# Patient Record
Sex: FEMALE | Race: WHITE | NOT HISPANIC OR LATINO | Employment: OTHER | RURAL
[De-identification: names, ages, dates, MRNs, and addresses within clinical notes are randomized per-mention and may not be internally consistent; named-entity substitution may affect disease eponyms.]

---

## 2017-03-23 ENCOUNTER — OFFICE VISIT (OUTPATIENT)
Dept: CARDIOLOGY | Facility: CLINIC | Age: 57
End: 2017-03-23

## 2017-03-23 VITALS
DIASTOLIC BLOOD PRESSURE: 82 MMHG | WEIGHT: 211 LBS | BODY MASS INDEX: 33.91 KG/M2 | SYSTOLIC BLOOD PRESSURE: 162 MMHG | HEIGHT: 66 IN | HEART RATE: 78 BPM

## 2017-03-23 DIAGNOSIS — I73.9 PAD (PERIPHERAL ARTERY DISEASE) (HCC): ICD-10-CM

## 2017-03-23 DIAGNOSIS — E11.59 TYPE 2 DIABETES MELLITUS WITH OTHER CIRCULATORY COMPLICATION: ICD-10-CM

## 2017-03-23 DIAGNOSIS — I25.810 CORONARY ARTERY DISEASE INVOLVING CORONARY BYPASS GRAFT OF NATIVE HEART WITHOUT ANGINA PECTORIS: Primary | ICD-10-CM

## 2017-03-23 PROCEDURE — 93000 ELECTROCARDIOGRAM COMPLETE: CPT | Performed by: INTERNAL MEDICINE

## 2017-03-23 PROCEDURE — 99213 OFFICE O/P EST LOW 20 MIN: CPT | Performed by: INTERNAL MEDICINE

## 2017-03-23 RX ORDER — ROSUVASTATIN CALCIUM 20 MG/1
20 TABLET, COATED ORAL DAILY
COMMUNITY
End: 2017-10-05 | Stop reason: ALTCHOICE

## 2017-03-23 RX ORDER — LISINOPRIL 10 MG/1
10 TABLET ORAL DAILY
COMMUNITY
End: 2017-10-05 | Stop reason: ALTCHOICE

## 2017-03-23 NOTE — PROGRESS NOTES
Subjective:     Encounter Date:03/23/2017      Patient ID: Vandana Glass is a 56 y.o. female.    Chief Complaint: PAD, CAD, DM    History of Present Illness     Dear Dr. Chase,      I had the pleasure of seeing  the patient in cardiac followup today.  As you well know, she is a dana 56-year-old woman with a history of coronary artery disease, status post bypass surgery.   She has peripheral arterial disease, status post aortobifemoral bypass.   She continues to smoke.  She has diabetes with an AIC of 8.5.        Over the past six months she has had no worsening of her symptoms.  Unfortunately, she has not make any progress with reducing her cardiac risk factors.           Review of Systems   All other systems reviewed and are negative.        ECG 12 Lead  Date/Time: 3/23/2017 11:29 AM  Performed by: MARGARITA SPRAGUE  Authorized by: MARGARITA SPRAGUE   Rhythm: sinus rhythm  BPM: 78  Comments: NSSTTWA               Objective:     Physical Exam   Constitutional: She is oriented to person, place, and time. She appears well-developed and well-nourished.   HENT:   Head: Normocephalic and atraumatic.   Neck: Normal range of motion. Neck supple.   Cardiovascular: Normal rate, regular rhythm and normal heart sounds.    Pulmonary/Chest: Effort normal and breath sounds normal.   Abdominal: Soft. Bowel sounds are normal.   Musculoskeletal: Normal range of motion.   Neurological: She is alert and oriented to person, place, and time.   Skin: Skin is warm and dry.   Psychiatric: She has a normal mood and affect. Her behavior is normal. Thought content normal.   Vitals reviewed.      Lab Review:       Assessment:          Diagnosis Plan   1. Coronary artery disease involving coronary bypass graft of native heart without angina pectoris     2. Type 2 diabetes mellitus with other circulatory complication     3. PAD (peripheral artery disease)            Plan:        It was a pleasure to see the patient in cardiac followup today.  She is  doing well from a cardiac standpoint.  She has no angina or heart failure symptoms.  We talked at length about managing her cardiac risk factors. I have urged her to quit smoking and have also encouraged her to try to walk at least 15 minutes per day as a start.   Hopefully, this will motivate her to change.  Otherwise, I think there are high risks in the future.       Coronary Artery Disease  Assessment  • The patient has no angina    Plan  • Lifestyle modifications discussed include adhering to a heart healthy diet, avoidance of tobacco products, maintenance of a healthy weight, medication compliance, regular exercise and regular monitoring of cholesterol and blood pressure    Subjective - Objective  • There is a history of previous coronary artery bypass graft  • There has been a previous stent procedure using JORGE  • Current antiplatelet therapy includes aspirin 81 mg

## 2017-10-05 ENCOUNTER — OFFICE VISIT (OUTPATIENT)
Dept: CARDIOLOGY | Facility: CLINIC | Age: 57
End: 2017-10-05

## 2017-10-05 VITALS
SYSTOLIC BLOOD PRESSURE: 132 MMHG | HEART RATE: 68 BPM | BODY MASS INDEX: 34.72 KG/M2 | HEIGHT: 66 IN | WEIGHT: 216 LBS | DIASTOLIC BLOOD PRESSURE: 80 MMHG

## 2017-10-05 DIAGNOSIS — F17.200 SMOKING: ICD-10-CM

## 2017-10-05 DIAGNOSIS — I73.9 PAD (PERIPHERAL ARTERY DISEASE) (HCC): ICD-10-CM

## 2017-10-05 DIAGNOSIS — I10 ESSENTIAL HYPERTENSION: ICD-10-CM

## 2017-10-05 DIAGNOSIS — I25.810 CORONARY ARTERY DISEASE INVOLVING CORONARY BYPASS GRAFT OF NATIVE HEART WITHOUT ANGINA PECTORIS: Primary | ICD-10-CM

## 2017-10-05 PROCEDURE — 99213 OFFICE O/P EST LOW 20 MIN: CPT | Performed by: INTERNAL MEDICINE

## 2017-10-05 PROCEDURE — 93000 ELECTROCARDIOGRAM COMPLETE: CPT | Performed by: INTERNAL MEDICINE

## 2017-10-05 RX ORDER — LISINOPRIL 20 MG/1
TABLET ORAL
COMMUNITY
Start: 2017-08-17 | End: 2019-03-07 | Stop reason: ALTCHOICE

## 2017-10-05 RX ORDER — ROSUVASTATIN CALCIUM 40 MG/1
TABLET, COATED ORAL
COMMUNITY
Start: 2017-08-17

## 2017-10-05 NOTE — PROGRESS NOTES
Subjective:     Encounter Date:10/05/2017      Patient ID: Vandana Glass is a 57 y.o. female.    Chief Complaint: CAD, PAD, smoking    History of Present Illness    Dear Dr. Chase,     I had the pleasure of seeing your patient in cardiac followup today.  As you well know, she is a dana 57-year-old woman with history of coronary artery disease status post bypass surgery.  She has peripheral arterial disease status post aortobifemoral bypass.  She has diabetes and she continues to smoke.      She comes in for her six month followup.  Since I have last seen her, she says she has reduced her smoking but has not quit.  She has trouble walking due to difficulty tolerating the humidity.  She says that as the weather gets cooler, she will increase her activity.          Review of Systems   All other systems reviewed and are negative.        ECG 12 Lead  Date/Time: 10/5/2017 11:03 AM  Performed by: MARGARITA SPRAGUE  Authorized by: MARGARITA SPRAGUE   Comparison: compared with previous ECG   Similar to previous ECG  Rhythm: sinus rhythm  BPM: 68  T depression: II, III, aVF, V6 and V5                 Objective:     Physical Exam   Constitutional: She is oriented to person, place, and time. She appears well-developed and well-nourished.   HENT:   Head: Normocephalic and atraumatic.   Neck: Normal range of motion. Neck supple.   Cardiovascular: Normal rate, regular rhythm and normal heart sounds.    Pulmonary/Chest: Effort normal and breath sounds normal.   Abdominal: Soft. Bowel sounds are normal.   Musculoskeletal: Normal range of motion.   Neurological: She is alert and oriented to person, place, and time.   Skin: Skin is warm and dry.   Psychiatric: She has a normal mood and affect. Her behavior is normal. Thought content normal.   Vitals reviewed.      Lab Review:       Assessment:          Diagnosis Plan   1. Coronary artery disease involving coronary bypass graft of native heart without angina pectoris     2. PAD (peripheral  artery disease)     3. Essential hypertension     4. Smoking            Plan:       It was a pleasure to see your patient in cardiac followup today.  She is stable without any complaints of angina or heart failure.  She does complain of some musculoskeletal type, band-like chest pain.  She says that if it happens at rest she moves around and if it happens while she is moving around, she will rest.  I do not think this is an anginal complaint.  She will see me again in six months or sooner if symptoms warrant.      Coronary Artery Disease  Assessment  • The patient has no angina    Plan  • Lifestyle modifications discussed include adhering to a heart healthy diet, avoidance of tobacco products, maintenance of a healthy weight, medication compliance, regular exercise and regular monitoring of cholesterol and blood pressure    Subjective - Objective  • There is a history of previous coronary artery bypass graft  • Current antiplatelet therapy includes aspirin 81 mg and clopidogrel 75 mg

## 2018-09-06 ENCOUNTER — OFFICE VISIT (OUTPATIENT)
Dept: CARDIOLOGY | Facility: CLINIC | Age: 58
End: 2018-09-06

## 2018-09-06 VITALS
HEART RATE: 67 BPM | WEIGHT: 229 LBS | HEIGHT: 66 IN | DIASTOLIC BLOOD PRESSURE: 78 MMHG | BODY MASS INDEX: 36.8 KG/M2 | SYSTOLIC BLOOD PRESSURE: 162 MMHG

## 2018-09-06 DIAGNOSIS — I73.9 PAD (PERIPHERAL ARTERY DISEASE) (HCC): ICD-10-CM

## 2018-09-06 DIAGNOSIS — I25.810 CORONARY ARTERY DISEASE INVOLVING CORONARY BYPASS GRAFT OF NATIVE HEART WITHOUT ANGINA PECTORIS: Primary | ICD-10-CM

## 2018-09-06 DIAGNOSIS — F17.200 SMOKING: ICD-10-CM

## 2018-09-06 DIAGNOSIS — I10 ESSENTIAL HYPERTENSION: ICD-10-CM

## 2018-09-06 PROCEDURE — 99213 OFFICE O/P EST LOW 20 MIN: CPT | Performed by: INTERNAL MEDICINE

## 2018-09-06 PROCEDURE — 93000 ELECTROCARDIOGRAM COMPLETE: CPT | Performed by: INTERNAL MEDICINE

## 2018-09-06 NOTE — PROGRESS NOTES
Subjective:     Encounter Date:09/06/2018      Patient ID: Vandana Glass is a 58 y.o. female.    Chief Complaint: CAD, PAD, smoking    History of Present Illness    Dear Dr. Chase:    I had the pleasure of seeing your patient in cardiac followup today. As you well know, she is a dana 57-year-old woman with history of coronary artery disease, status post multiple stents and a ventral bypass surgery. She has been angina-free since.    She has peripheral arterial disease, status post iliac stenting as well as an aortofemoral bypass on the right. Unfortunately, she has diabetes and she continues to smoke.    Since I have last seen her, she reports doing pretty well. She says that she is starting to feel a little pinching in her left groin. She has some mild claudication but is not yet lifestyle limiting. She does not have any evidence of foot ulcer or infection.        Review of Systems   All other systems reviewed and are negative.        ECG 12 Lead  Date/Time: 9/6/2018 12:52 PM  Performed by: MARGARITA SPRAGUE  Authorized by: MARGARITA SPRAGUE   Comparison: compared with previous ECG   Similar to previous ECG  Rhythm: sinus rhythm  BPM: 67  Comments: Diffuse T wave inversion               Objective:     Physical Exam   Constitutional: She is oriented to person, place, and time. She appears well-developed and well-nourished.   HENT:   Head: Normocephalic and atraumatic.   Neck: Normal range of motion. Neck supple.   Cardiovascular: Normal rate, regular rhythm and normal heart sounds.    Pulmonary/Chest: Effort normal and breath sounds normal.   Abdominal: Soft. Bowel sounds are normal.   Musculoskeletal: Normal range of motion.   Neurological: She is alert and oriented to person, place, and time.   Skin: Skin is warm and dry.   Psychiatric: She has a normal mood and affect. Her behavior is normal. Thought content normal.   Vitals reviewed.      Lab Review:       Assessment:          Diagnosis Plan   1. Coronary artery disease  involving coronary bypass graft of native heart without angina pectoris     2. PAD (peripheral artery disease) (CMS/[facility])     3. Smoking     4. Essential hypertension            Plan:       It was a pleasure to see Vandana Glass in cardiac followup today.  She is doing well from the cardiac standpoint without any complaints of angina.  Unfortunately, she continues to smoke and is not interested in quitting.  She is on a good medical regimen for coronary prevention.    She has peripheral arterial disease with some increasing left leg claudication.  At this time it is not yet lifestyle limiting.  She has no evidence of critical limb ischemia.  I recommended she see me again in six months for reassessment.      Coronary Artery Disease  Assessment  • The patient has no angina    Plan  • Lifestyle modifications discussed include adhering to a heart healthy diet, avoidance of tobacco products, maintenance of a healthy weight, medication compliance, regular exercise and regular monitoring of cholesterol and blood pressure    Subjective - Objective  • There is a history of previous coronary artery bypass graft  • Current antiplatelet therapy includes aspirin 81 mg and clopidogrel 75 mg

## 2018-12-28 ENCOUNTER — HOSPITAL ENCOUNTER (INPATIENT)
Facility: HOSPITAL | Age: 58
LOS: 1 days | Discharge: HOME | End: 2018-12-28
Attending: INTERNAL MEDICINE | Admitting: INTERNAL MEDICINE

## 2018-12-28 VITALS
WEIGHT: 203 LBS | RESPIRATION RATE: 16 BRPM | BODY MASS INDEX: 32.62 KG/M2 | DIASTOLIC BLOOD PRESSURE: 60 MMHG | OXYGEN SATURATION: 95 % | HEIGHT: 66 IN | SYSTOLIC BLOOD PRESSURE: 148 MMHG | HEART RATE: 71 BPM | TEMPERATURE: 98 F

## 2018-12-28 DIAGNOSIS — I21.4 NSTEMI (NON-ST ELEVATED MYOCARDIAL INFARCTION) (HCC): Primary | ICD-10-CM

## 2018-12-28 LAB — GLUCOSE BLDC GLUCOMTR-MCNC: 75 MG/DL (ref 70–130)

## 2018-12-28 PROCEDURE — B2131ZZ FLUOROSCOPY OF MULTIPLE CORONARY ARTERY BYPASS GRAFTS USING LOW OSMOLAR CONTRAST: ICD-10-PCS | Performed by: INTERNAL MEDICINE

## 2018-12-28 PROCEDURE — C1769 GUIDE WIRE: HCPCS | Performed by: INTERNAL MEDICINE

## 2018-12-28 PROCEDURE — 25010000002 HEPARIN (PORCINE) PER 1000 UNITS: Performed by: INTERNAL MEDICINE

## 2018-12-28 PROCEDURE — B2151ZZ FLUOROSCOPY OF LEFT HEART USING LOW OSMOLAR CONTRAST: ICD-10-PCS | Performed by: INTERNAL MEDICINE

## 2018-12-28 PROCEDURE — 25010000002 MIDAZOLAM PER 1 MG: Performed by: INTERNAL MEDICINE

## 2018-12-28 PROCEDURE — 99152 MOD SED SAME PHYS/QHP 5/>YRS: CPT | Performed by: INTERNAL MEDICINE

## 2018-12-28 PROCEDURE — 4A023N7 MEASUREMENT OF CARDIAC SAMPLING AND PRESSURE, LEFT HEART, PERCUTANEOUS APPROACH: ICD-10-PCS | Performed by: INTERNAL MEDICINE

## 2018-12-28 PROCEDURE — 93459 L HRT ART/GRFT ANGIO: CPT | Performed by: INTERNAL MEDICINE

## 2018-12-28 PROCEDURE — 82962 GLUCOSE BLOOD TEST: CPT

## 2018-12-28 PROCEDURE — C1894 INTRO/SHEATH, NON-LASER: HCPCS | Performed by: INTERNAL MEDICINE

## 2018-12-28 PROCEDURE — 0 IOPAMIDOL PER 1 ML: Performed by: INTERNAL MEDICINE

## 2018-12-28 PROCEDURE — 25010000002 FENTANYL CITRATE (PF) 100 MCG/2ML SOLUTION: Performed by: INTERNAL MEDICINE

## 2018-12-28 PROCEDURE — 99223 1ST HOSP IP/OBS HIGH 75: CPT | Performed by: INTERNAL MEDICINE

## 2018-12-28 PROCEDURE — B2111ZZ FLUOROSCOPY OF MULTIPLE CORONARY ARTERIES USING LOW OSMOLAR CONTRAST: ICD-10-PCS | Performed by: INTERNAL MEDICINE

## 2018-12-28 RX ORDER — MIDAZOLAM HYDROCHLORIDE 1 MG/ML
INJECTION INTRAMUSCULAR; INTRAVENOUS AS NEEDED
Status: DISCONTINUED | OUTPATIENT
Start: 2018-12-28 | End: 2018-12-28 | Stop reason: HOSPADM

## 2018-12-28 RX ORDER — FENTANYL CITRATE 50 UG/ML
INJECTION, SOLUTION INTRAMUSCULAR; INTRAVENOUS AS NEEDED
Status: DISCONTINUED | OUTPATIENT
Start: 2018-12-28 | End: 2018-12-28 | Stop reason: HOSPADM

## 2018-12-28 RX ORDER — ACETAMINOPHEN 325 MG/1
650 TABLET ORAL EVERY 4 HOURS PRN
Status: DISCONTINUED | OUTPATIENT
Start: 2018-12-28 | End: 2018-12-28 | Stop reason: HOSPADM

## 2018-12-28 RX ORDER — ROSUVASTATIN CALCIUM 40 MG/1
40 TABLET, COATED ORAL DAILY
Status: DISCONTINUED | OUTPATIENT
Start: 2018-12-28 | End: 2018-12-28 | Stop reason: HOSPADM

## 2018-12-28 RX ORDER — SODIUM CHLORIDE 9 MG/ML
100 INJECTION, SOLUTION INTRAVENOUS CONTINUOUS
Status: ACTIVE | OUTPATIENT
Start: 2018-12-28 | End: 2018-12-28

## 2018-12-28 RX ORDER — ASPIRIN 81 MG/1
81 TABLET ORAL DAILY
Status: DISCONTINUED | OUTPATIENT
Start: 2018-12-28 | End: 2018-12-28 | Stop reason: HOSPADM

## 2018-12-28 RX ORDER — NICOTINE POLACRILEX 4 MG
15 LOZENGE BUCCAL
Status: DISCONTINUED | OUTPATIENT
Start: 2018-12-28 | End: 2018-12-28 | Stop reason: HOSPADM

## 2018-12-28 RX ORDER — FUROSEMIDE 40 MG/1
40 TABLET ORAL DAILY
Status: DISCONTINUED | OUTPATIENT
Start: 2018-12-28 | End: 2018-12-28 | Stop reason: HOSPADM

## 2018-12-28 RX ORDER — DEXTROSE MONOHYDRATE 25 G/50ML
25 INJECTION, SOLUTION INTRAVENOUS
Status: DISCONTINUED | OUTPATIENT
Start: 2018-12-28 | End: 2018-12-28 | Stop reason: HOSPADM

## 2018-12-28 RX ORDER — CITALOPRAM 40 MG/1
40 TABLET ORAL DAILY
Status: DISCONTINUED | OUTPATIENT
Start: 2018-12-28 | End: 2018-12-28 | Stop reason: HOSPADM

## 2018-12-28 RX ORDER — SODIUM CHLORIDE 0.9 % (FLUSH) 0.9 %
3-10 SYRINGE (ML) INJECTION AS NEEDED
Status: DISCONTINUED | OUTPATIENT
Start: 2018-12-28 | End: 2018-12-28 | Stop reason: HOSPADM

## 2018-12-28 RX ORDER — INSULIN GLARGINE 100 [IU]/ML
50 INJECTION, SOLUTION SUBCUTANEOUS EVERY 12 HOURS SCHEDULED
Status: DISCONTINUED | OUTPATIENT
Start: 2018-12-28 | End: 2018-12-28 | Stop reason: HOSPADM

## 2018-12-28 RX ORDER — LIDOCAINE HYDROCHLORIDE 20 MG/ML
INJECTION, SOLUTION INFILTRATION; PERINEURAL AS NEEDED
Status: DISCONTINUED | OUTPATIENT
Start: 2018-12-28 | End: 2018-12-28 | Stop reason: HOSPADM

## 2018-12-28 RX ORDER — LOSARTAN POTASSIUM 50 MG/1
50 TABLET ORAL DAILY
Status: DISCONTINUED | OUTPATIENT
Start: 2018-12-28 | End: 2018-12-28 | Stop reason: HOSPADM

## 2018-12-28 RX ORDER — NITROGLYCERIN 0.4 MG/1
0.4 TABLET SUBLINGUAL
Status: DISCONTINUED | OUTPATIENT
Start: 2018-12-28 | End: 2018-12-28 | Stop reason: HOSPADM

## 2018-12-28 RX ORDER — PANTOPRAZOLE SODIUM 40 MG/1
40 TABLET, DELAYED RELEASE ORAL DAILY
Status: DISCONTINUED | OUTPATIENT
Start: 2018-12-28 | End: 2018-12-28 | Stop reason: HOSPADM

## 2018-12-28 RX ORDER — SODIUM CHLORIDE 0.9 % (FLUSH) 0.9 %
3 SYRINGE (ML) INJECTION EVERY 12 HOURS SCHEDULED
Status: DISCONTINUED | OUTPATIENT
Start: 2018-12-28 | End: 2018-12-28 | Stop reason: HOSPADM

## 2018-12-28 RX ORDER — LISINOPRIL 20 MG/1
20 TABLET ORAL
Status: DISCONTINUED | OUTPATIENT
Start: 2018-12-28 | End: 2018-12-28 | Stop reason: HOSPADM

## 2018-12-28 RX ORDER — SODIUM CHLORIDE 9 MG/ML
INJECTION, SOLUTION INTRAVENOUS CONTINUOUS PRN
Status: COMPLETED | OUTPATIENT
Start: 2018-12-28 | End: 2018-12-28

## 2018-12-28 RX ORDER — CLOPIDOGREL BISULFATE 75 MG/1
75 TABLET ORAL DAILY
Status: DISCONTINUED | OUTPATIENT
Start: 2018-12-28 | End: 2018-12-28 | Stop reason: HOSPADM

## 2018-12-29 ENCOUNTER — READMISSION MANAGEMENT (OUTPATIENT)
Dept: CALL CENTER | Facility: HOSPITAL | Age: 58
End: 2018-12-29

## 2018-12-29 NOTE — OUTREACH NOTE
Prep Survey      Responses   Facility patient discharged from?  [facility]   Is patient eligible?  No   What are the reasons patient is not eligible?  Other   Does the patient have one of the following disease processes/diagnoses(primary or secondary)?  Other   Prep survey completed?  Yes          Alondra Gutiérrez RN

## 2018-12-31 ENCOUNTER — TELEPHONE (OUTPATIENT)
Dept: CARDIAC REHAB | Facility: HOSPITAL | Age: 58
End: 2018-12-31

## 2018-12-31 NOTE — TELEPHONE ENCOUNTER
Called pt secondary to referral from Dr. Parra concerning cardiac rehab.  Pt lives in [place], [place] and does not have a car.  She thinks she could manage to get to program at [facility] in [place] though.  I provided pt with the telephone number to that program.  She will call and set up appt.

## 2019-01-24 ENCOUNTER — TELEPHONE (OUTPATIENT)
Dept: CARDIOLOGY | Facility: CLINIC | Age: 59
End: 2019-01-24

## 2019-03-07 ENCOUNTER — OFFICE VISIT (OUTPATIENT)
Dept: CARDIOLOGY | Facility: CLINIC | Age: 59
End: 2019-03-07

## 2019-03-07 VITALS
WEIGHT: 200 LBS | SYSTOLIC BLOOD PRESSURE: 160 MMHG | DIASTOLIC BLOOD PRESSURE: 82 MMHG | HEART RATE: 68 BPM | BODY MASS INDEX: 32.14 KG/M2 | HEIGHT: 66 IN

## 2019-03-07 DIAGNOSIS — I25.810 CORONARY ARTERY DISEASE INVOLVING CORONARY BYPASS GRAFT OF NATIVE HEART WITHOUT ANGINA PECTORIS: Primary | ICD-10-CM

## 2019-03-07 DIAGNOSIS — I73.9 PAD (PERIPHERAL ARTERY DISEASE) (HCC): ICD-10-CM

## 2019-03-07 DIAGNOSIS — I10 ESSENTIAL HYPERTENSION: ICD-10-CM

## 2019-03-07 DIAGNOSIS — F17.200 SMOKING: ICD-10-CM

## 2019-03-07 PROCEDURE — 93000 ELECTROCARDIOGRAM COMPLETE: CPT | Performed by: INTERNAL MEDICINE

## 2019-03-07 PROCEDURE — 99213 OFFICE O/P EST LOW 20 MIN: CPT | Performed by: INTERNAL MEDICINE
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
## 2019-11-10 ENCOUNTER — HOSPITAL ENCOUNTER (OUTPATIENT)
Facility: HOSPITAL | Age: 59
Setting detail: OBSERVATION
Discharge: HOME | End: 2019-11-13
Attending: INTERNAL MEDICINE | Admitting: INTERNAL MEDICINE

## 2019-11-10 DIAGNOSIS — I25.119 CORONARY ARTERY DISEASE INVOLVING NATIVE CORONARY ARTERY OF NATIVE HEART WITH ANGINA PECTORIS (HCC): Primary | ICD-10-CM

## 2019-11-10 PROBLEM — R07.9 CHEST PAIN: Status: ACTIVE | Noted: 2019-11-10

## 2019-11-10 LAB
GLUCOSE BLDC GLUCOMTR-MCNC: 100 MG/DL (ref 70–130)
GLUCOSE BLDC GLUCOMTR-MCNC: 129 MG/DL (ref 70–130)
TROPONIN T SERPL-MCNC: <0.01 NG/ML (ref 0–0.03)

## 2019-11-10 PROCEDURE — 93005 ELECTROCARDIOGRAM TRACING: CPT | Performed by: INTERNAL MEDICINE

## 2019-11-10 PROCEDURE — 82962 GLUCOSE BLOOD TEST: CPT

## 2019-11-10 PROCEDURE — 84484 ASSAY OF TROPONIN QUANT: CPT | Performed by: INTERNAL MEDICINE

## 2019-11-10 PROCEDURE — 93010 ELECTROCARDIOGRAM REPORT: CPT | Performed by: INTERNAL MEDICINE

## 2019-11-10 PROCEDURE — 99220 PR INITIAL OBSERVATION CARE/DAY 70 MINUTES: CPT | Performed by: INTERNAL MEDICINE

## 2019-11-10 PROCEDURE — G0378 HOSPITAL OBSERVATION PER HR: HCPCS

## 2019-11-10 RX ORDER — CLOPIDOGREL BISULFATE 75 MG/1
75 TABLET ORAL DAILY
Status: DISCONTINUED | OUTPATIENT
Start: 2019-11-10 | End: 2019-11-13 | Stop reason: HOSPADM

## 2019-11-10 RX ORDER — PANTOPRAZOLE SODIUM 40 MG/1
40 TABLET, DELAYED RELEASE ORAL DAILY
Status: DISCONTINUED | OUTPATIENT
Start: 2019-11-10 | End: 2019-11-13 | Stop reason: HOSPADM

## 2019-11-10 RX ORDER — SODIUM CHLORIDE 0.9 % (FLUSH) 0.9 %
10 SYRINGE (ML) INJECTION EVERY 12 HOURS SCHEDULED
Status: DISCONTINUED | OUTPATIENT
Start: 2019-11-10 | End: 2019-11-13 | Stop reason: HOSPADM

## 2019-11-10 RX ORDER — ASPIRIN 81 MG/1
81 TABLET ORAL DAILY
Status: DISCONTINUED | OUTPATIENT
Start: 2019-11-10 | End: 2019-11-13 | Stop reason: HOSPADM

## 2019-11-10 RX ORDER — ROSUVASTATIN CALCIUM 20 MG/1
20 TABLET, COATED ORAL NIGHTLY
Status: DISCONTINUED | OUTPATIENT
Start: 2019-11-10 | End: 2019-11-13 | Stop reason: HOSPADM

## 2019-11-10 RX ORDER — NITROGLYCERIN 0.4 MG/1
0.4 TABLET SUBLINGUAL
Status: DISCONTINUED | OUTPATIENT
Start: 2019-11-10 | End: 2019-11-13 | Stop reason: HOSPADM

## 2019-11-10 RX ORDER — CITALOPRAM 40 MG/1
40 TABLET ORAL DAILY
Status: DISCONTINUED | OUTPATIENT
Start: 2019-11-10 | End: 2019-11-13 | Stop reason: HOSPADM

## 2019-11-10 RX ORDER — LOSARTAN POTASSIUM 50 MG/1
50 TABLET ORAL DAILY
Status: DISCONTINUED | OUTPATIENT
Start: 2019-11-10 | End: 2019-11-12

## 2019-11-10 RX ORDER — SODIUM CHLORIDE 0.9 % (FLUSH) 0.9 %
10 SYRINGE (ML) INJECTION AS NEEDED
Status: DISCONTINUED | OUTPATIENT
Start: 2019-11-10 | End: 2019-11-13 | Stop reason: HOSPADM

## 2019-11-10 RX ORDER — FUROSEMIDE 40 MG/1
40 TABLET ORAL DAILY
Status: DISCONTINUED | OUTPATIENT
Start: 2019-11-10 | End: 2019-11-11

## 2019-11-10 RX ADMIN — SODIUM CHLORIDE, PRESERVATIVE FREE 10 ML: 5 INJECTION INTRAVENOUS at 20:08

## 2019-11-10 RX ADMIN — METOPROLOL TARTRATE 25 MG: 25 TABLET ORAL at 20:07

## 2019-11-10 RX ADMIN — ROSUVASTATIN CALCIUM 20 MG: 20 TABLET, FILM COATED ORAL at 20:08

## 2019-11-10 NOTE — H&P
Date of Hospital Visit: 11/10/19  Encounter Provider: James Chapman MD  Place of Service: [facility] CARDIOLOGY  Patient Name: Vandana Glass  :1960  Referral Provider: Saurabh Camp MD    Chief complaint: CP    History of Present Illness     Ms. Glass is a 60yo woman who has been seen by Indigo Camp and Fidel in the past.  She has a history of CAD s/p CABG.  She underwent coronary angiography in 2018 which revealed patents grafts to the OM and RCA, no significant LAD disease, and moderate disease of the LCx which was treated medically.  When she was last seen in the office in March, she reported symptoms during periods of emotional stress only.    She had a really stressful day yesterday.  She then laid down and had severe head pain that radiated into her chest, jaw, and bilateral arms.  She went to [facility] and her initial evaluation was negative.  She was admitted and her second Tn I came back at 0.13.    Currently she is chest pain free.  She does still smoke.  She has been compliant with all of her medications.     Past Medical History:   Diagnosis Date   • Coronary artery disease     Prior CABG x 4.  Cath 2018: 10% LM, 30% prox LAD, 60% mid LCx,  OM1,  RCA, unused LIMA, SVG-OM patent, SVG-RCA patent but native RCA is very small with occlusion at the ROSE; 2 grafts occluded (I don't know targets)    • Hyperlipidemia    • Hypertension    • Peripheral arterial disease (CMS/HCC)    • Type 2 diabetes mellitus (CMS/[facility])        Past Surgical History:   Procedure Laterality Date   • APPENDECTOMY     • CARDIAC CATHETERIZATION     • CARDIAC CATHETERIZATION N/A 2018    Procedure: Left ventriculography;  Surgeon: Fan Parra MD;  Location:  [facility];  Service: Cardiovascular   • CARDIAC CATHETERIZATION N/A 2018    Procedure: Coronary angiography;  Surgeon: Fan Parra MD;  Location:  [facility];  Service:  Cardiovascular   • CARDIAC CATHETERIZATION  12/28/2018    Procedure: Saphenous Vein Graft;  Surgeon: Fan Prara MD;  Location:  [facility];  Service: Cardiovascular   • CARDIAC CATHETERIZATION N/A 12/28/2018    Procedure: Left Heart Cath;  Surgeon: Fan Parra MD;  Location:  [facility];  Service: Cardiovascular   • CARDIAC SURGERY     • CORONARY ARTERY BYPASS GRAFT     • HYSTERECTOMY     • TUBAL ABDOMINAL LIGATION     • VASCULAR SURGERY         Prior to Admission medications    Medication Sig Start Date End Date Taking? Authorizing Provider   aspirin 81 MG tablet Take 1 tablet by mouth daily. 11/21/13   Alena Almazan MD   citalopram (CeleXA) 40 MG tablet Take 1 tablet by mouth daily. 11/21/13   Alena Almazan MD   clopidogrel (PLAVIX) 75 MG tablet Take 1 tablet by mouth daily. 11/21/13   Alena Almazan MD   furosemide (LASIX) 40 MG tablet Take 1 tablet by mouth daily. 11/21/13   Alena Almazan MD   Insulin Degludec (TRESIBA FLEXTOUCH SC) Inject 50 Units under the skin into the appropriate area as directed 2 (Two) Times a Day.    Alena Almazan MD   insulin lispro (HUMALOG) 100 UNIT/ML injection Inject 20 Units under the skin into the appropriate area as directed 3 (Three) Times a Day Before Meals. 11/21/13   Alena Almazan MD   Losartan Potassium (COZAAR PO) Take 50 mg by mouth Daily.    Alena Almazan MD   metFORMIN (GLUCOPHAGE) 1000 MG tablet Take 1 tablet by mouth 2 (two) times a day. 11/21/13   Alena Almazan MD   metoprolol tartrate (LOPRESSOR) 50 MG tablet Take 0.5 tablets by mouth 2 (two) times a day. 11/21/13   Alena Almazan MD   nitroglycerin (NITROSTAT) 0.4 MG SL tablet Place 1 tablet under the tongue as needed. 11/21/13   Alena Almazan MD   pantoprazole (PROTONIX) 40 MG EC tablet Take 1 tablet by mouth daily. 11/21/13   Alena Almazan MD   rosuvastatin (CRESTOR) 40 MG tablet  8/17/17    ProviderAlena MD   Semaglutide (OZEMPIC SC) Inject 0.5 mg under the skin into the appropriate area as directed 1 (One) Time Per Week.    ProviderAlena MD       Social History     Socioeconomic History   • Marital status:      Spouse name: Not on file   • Number of children: Not on file   • Years of education: Not on file   • Highest education level: Not on file   Tobacco Use   • Smoking status: Current Every Day Smoker     Packs/day: 1.00   • Smokeless tobacco: Never Used   Substance and Sexual Activity   • Alcohol use: No   • Sexual activity: Defer       History reviewed. No pertinent family history.    Review of Systems   Constitutional: Positive for fatigue.   Respiratory: Positive for cough and shortness of breath.    Cardiovascular: Positive for chest pain.   Neurological: Positive for numbness.   Psychiatric/Behavioral: Positive for dysphoric mood. The patient is nervous/anxious.    All other systems reviewed and are negative.       Objective:   There were no vitals filed for this visit.  There is no height or weight on file to calculate BMI.    Last Weight and Admission Weight    There were no vitals filed for this visit.      No intake or output data in the 24 hours ending 11/10/19 1806      Physical Exam   Constitutional: She is oriented to person, place, and time.   Looks older than stated age   HENT:   Head: Normocephalic.   Nose: Nose normal.   Mouth/Throat: Oropharynx is clear and moist.   Eyes: Conjunctivae and EOM are normal. Pupils are equal, round, and reactive to light.   Neck: Normal range of motion. No JVD present.   Cardiovascular: Normal rate, regular rhythm, normal heart sounds and intact distal pulses.   Pulmonary/Chest: Effort normal. She has decreased breath sounds.   + cough   Abdominal: Soft. There is no tenderness.   Musculoskeletal: Normal range of motion. She exhibits no edema.   Neurological: She is alert and oriented to person, place, and time. No cranial  nerve deficit.   Skin: Skin is warm and dry. No erythema.   Psychiatric: She has a normal mood and affect. Her behavior is normal. Judgment and thought content normal.   Vitals reviewed.              Lab Review:     WBC13  Hgb 14.7  Plt 220    Na 137  K 3.6  Bicarb 26  BUN 26  Cr 1.07  Gluc 210  Normal LFTs    Tn I 0.13               LDL unreportable  HDL 41      I personally viewed and interpreted the patient's EKG/Telemetry data    EKG --   I have personally reviewed EKG on 11/10/2019 and my interpretation of the tracing is as follows: NSR, diffuse ST depression, unchanged from prior          Assessment/Plan:     1.  Coronary artery disease involving native coronary artery of native heart with angina pectoris    2.  Type 2 diabetes mellitus (CMS/HCC)    3.   Hypertension    4.   Tobacco abuse    Recheck Tn through our lab so I have a better understanding of its elevation. Continue DAPT and home medications.  Currently CP-free.    Make NPO after midnight; will determine disposition in AM as long as no events happen overnight.

## 2019-11-11 ENCOUNTER — APPOINTMENT (OUTPATIENT)
Dept: NUCLEAR MEDICINE | Facility: HOSPITAL | Age: 59
End: 2019-11-11

## 2019-11-11 PROBLEM — I25.10 CORONARY ARTERY DISEASE: Status: ACTIVE | Noted: 2019-11-11

## 2019-11-11 LAB
ANION GAP SERPL CALCULATED.3IONS-SCNC: 10.6 MMOL/L (ref 5–15)
BH CV STRESS COMMENTS STAGE 1: NORMAL
BH CV STRESS DOSE REGADENOSON STAGE 1: 0.4
BH CV STRESS DURATION MIN STAGE 1: 0
BH CV STRESS DURATION SEC STAGE 1: 10
BH CV STRESS PROTOCOL 1: NORMAL
BH CV STRESS RECOVERY BP: NORMAL MMHG
BH CV STRESS RECOVERY HR: 90 BPM
BH CV STRESS STAGE 1: 1
BUN BLD-MCNC: 18 MG/DL (ref 6–20)
BUN/CREAT SERPL: 22 (ref 7–25)
CALCIUM SPEC-SCNC: 9.1 MG/DL (ref 8.6–10.5)
CHLORIDE SERPL-SCNC: 102 MMOL/L (ref 98–107)
CHOLEST SERPL-MCNC: 112 MG/DL (ref 0–200)
CO2 SERPL-SCNC: 30.4 MMOL/L (ref 22–29)
CREAT BLD-MCNC: 0.82 MG/DL (ref 0.57–1)
DEPRECATED RDW RBC AUTO: 41.6 FL (ref 37–54)
ERYTHROCYTE [DISTWIDTH] IN BLOOD BY AUTOMATED COUNT: 13.3 % (ref 12.3–15.4)
GFR SERPL CREATININE-BSD FRML MDRD: 72 ML/MIN/1.73
GLUCOSE BLD-MCNC: 106 MG/DL (ref 65–99)
GLUCOSE BLDC GLUCOMTR-MCNC: 102 MG/DL (ref 70–130)
GLUCOSE BLDC GLUCOMTR-MCNC: 109 MG/DL (ref 70–130)
GLUCOSE BLDC GLUCOMTR-MCNC: 151 MG/DL (ref 70–130)
GLUCOSE BLDC GLUCOMTR-MCNC: 176 MG/DL (ref 70–130)
HCT VFR BLD AUTO: 42.7 % (ref 34–46.6)
HDLC SERPL-MCNC: 42 MG/DL (ref 40–60)
HGB BLD-MCNC: 14.7 G/DL (ref 12–15.9)
LDLC SERPL CALC-MCNC: 41 MG/DL (ref 0–100)
LDLC/HDLC SERPL: 0.98 {RATIO}
LV EF NUC BP: 57 %
MAXIMAL PREDICTED HEART RATE: 162 BPM
MCH RBC QN AUTO: 29.9 PG (ref 26.6–33)
MCHC RBC AUTO-ENTMCNC: 34.4 G/DL (ref 31.5–35.7)
MCV RBC AUTO: 87 FL (ref 79–97)
PERCENT MAX PREDICTED HR: 69.14 %
PLATELET # BLD AUTO: 193 10*3/MM3 (ref 140–450)
PMV BLD AUTO: 9.8 FL (ref 6–12)
POTASSIUM BLD-SCNC: 4.2 MMOL/L (ref 3.5–5.2)
RBC # BLD AUTO: 4.91 10*6/MM3 (ref 3.77–5.28)
SODIUM BLD-SCNC: 143 MMOL/L (ref 136–145)
STRESS BASELINE BP: NORMAL MMHG
STRESS BASELINE HR: 71 BPM
STRESS PERCENT HR: 81 %
STRESS POST PEAK BP: NORMAL MMHG
STRESS POST PEAK HR: 112 BPM
STRESS TARGET HR: 138 BPM
TRIGL SERPL-MCNC: 144 MG/DL (ref 0–150)
TROPONIN T SERPL-MCNC: <0.01 NG/ML (ref 0–0.03)
VLDLC SERPL-MCNC: 28.8 MG/DL (ref 5–40)
WBC NRBC COR # BLD: 7.24 10*3/MM3 (ref 3.4–10.8)

## 2019-11-11 PROCEDURE — 80061 LIPID PANEL: CPT | Performed by: INTERNAL MEDICINE

## 2019-11-11 PROCEDURE — 80048 BASIC METABOLIC PNL TOTAL CA: CPT | Performed by: INTERNAL MEDICINE

## 2019-11-11 PROCEDURE — 0 TECHNETIUM SESTAMIBI: Performed by: INTERNAL MEDICINE

## 2019-11-11 PROCEDURE — 25010000002 REGADENOSON 0.4 MG/5ML SOLUTION: Performed by: INTERNAL MEDICINE

## 2019-11-11 PROCEDURE — 63710000001 INSULIN LISPRO (HUMAN) PER 5 UNITS: Performed by: INTERNAL MEDICINE

## 2019-11-11 PROCEDURE — 82962 GLUCOSE BLOOD TEST: CPT

## 2019-11-11 PROCEDURE — G0378 HOSPITAL OBSERVATION PER HR: HCPCS

## 2019-11-11 PROCEDURE — 93018 CV STRESS TEST I&R ONLY: CPT | Performed by: INTERNAL MEDICINE

## 2019-11-11 PROCEDURE — 93016 CV STRESS TEST SUPVJ ONLY: CPT | Performed by: INTERNAL MEDICINE

## 2019-11-11 PROCEDURE — 96374 THER/PROPH/DIAG INJ IV PUSH: CPT

## 2019-11-11 PROCEDURE — 93017 CV STRESS TEST TRACING ONLY: CPT

## 2019-11-11 PROCEDURE — 99225 PR SBSQ OBSERVATION CARE/DAY 25 MINUTES: CPT | Performed by: INTERNAL MEDICINE

## 2019-11-11 PROCEDURE — 25010000002 HYDRALAZINE PER 20 MG: Performed by: INTERNAL MEDICINE

## 2019-11-11 PROCEDURE — 78452 HT MUSCLE IMAGE SPECT MULT: CPT | Performed by: INTERNAL MEDICINE

## 2019-11-11 PROCEDURE — 85027 COMPLETE CBC AUTOMATED: CPT | Performed by: INTERNAL MEDICINE

## 2019-11-11 PROCEDURE — A9500 TC99M SESTAMIBI: HCPCS | Performed by: INTERNAL MEDICINE

## 2019-11-11 PROCEDURE — 78452 HT MUSCLE IMAGE SPECT MULT: CPT

## 2019-11-11 PROCEDURE — 84484 ASSAY OF TROPONIN QUANT: CPT | Performed by: INTERNAL MEDICINE

## 2019-11-11 RX ORDER — SODIUM CHLORIDE 9 MG/ML
75 INJECTION, SOLUTION INTRAVENOUS CONTINUOUS
Status: DISCONTINUED | OUTPATIENT
Start: 2019-11-12 | End: 2019-11-13 | Stop reason: HOSPADM

## 2019-11-11 RX ORDER — HYDRALAZINE HYDROCHLORIDE 20 MG/ML
10 INJECTION INTRAMUSCULAR; INTRAVENOUS ONCE
Status: COMPLETED | OUTPATIENT
Start: 2019-11-11 | End: 2019-11-11

## 2019-11-11 RX ADMIN — PANTOPRAZOLE SODIUM 40 MG: 40 TABLET, DELAYED RELEASE ORAL at 09:13

## 2019-11-11 RX ADMIN — ROSUVASTATIN CALCIUM 20 MG: 20 TABLET, FILM COATED ORAL at 20:19

## 2019-11-11 RX ADMIN — CLOPIDOGREL 75 MG: 75 TABLET, FILM COATED ORAL at 09:13

## 2019-11-11 RX ADMIN — SODIUM CHLORIDE, PRESERVATIVE FREE 10 ML: 5 INJECTION INTRAVENOUS at 09:19

## 2019-11-11 RX ADMIN — ASPIRIN 81 MG: 81 TABLET, COATED ORAL at 09:13

## 2019-11-11 RX ADMIN — LOSARTAN POTASSIUM 50 MG: 50 TABLET, FILM COATED ORAL at 10:12

## 2019-11-11 RX ADMIN — TECHNETIUM TC 99M SESTAMIBI 1 DOSE: 1 INJECTION INTRAVENOUS at 15:00

## 2019-11-11 RX ADMIN — HYDRALAZINE HYDROCHLORIDE 10 MG: 20 INJECTION INTRAMUSCULAR; INTRAVENOUS at 11:43

## 2019-11-11 RX ADMIN — METOPROLOL TARTRATE 25 MG: 25 TABLET ORAL at 16:40

## 2019-11-11 RX ADMIN — INSULIN LISPRO 20 UNITS: 100 INJECTION, SOLUTION INTRAVENOUS; SUBCUTANEOUS at 16:39

## 2019-11-11 RX ADMIN — SODIUM CHLORIDE, PRESERVATIVE FREE 10 ML: 5 INJECTION INTRAVENOUS at 20:19

## 2019-11-11 RX ADMIN — METOPROLOL TARTRATE 25 MG: 25 TABLET ORAL at 20:19

## 2019-11-11 RX ADMIN — REGADENOSON 0.4 MG: 0.08 INJECTION, SOLUTION INTRAVENOUS at 15:00

## 2019-11-11 RX ADMIN — TECHNETIUM TC 99M SESTAMIBI 1 DOSE: 1 INJECTION INTRAVENOUS at 10:45

## 2019-11-11 NOTE — PROGRESS NOTES
LOS: 1 day   Patient Care Team:  Duglas Dixon MD as PCP - General (Internal Medicine)  Duglas Dixon MD as PCP - Claims Attributed    Chief Complaint: Follow-up for chest pain, coronary artery disease.    Interval History: No chest or arm pain overnight.  Shortness of breath stable.    Vital Signs:  Temp:  [98.1 °F (36.7 °C)-98.4 °F (36.9 °C)] 98.2 °F (36.8 °C)  Heart Rate:  [50-70] 59  Resp:  [16-18] 16  BP: (132-180)/(56-77) 152/77  No intake or output data in the 24 hours ending 11/11/19 0854    Physical Exam:   General Appearance:    No acute distress, alert and oriented x4   Lungs:     Bilateral rhonchi    Heart:    Regular rhythm and normal rate.  No murmurs, gallops, or       rubs.   Abdomen:     Soft, non-tender, non-distended.    Extremities:   Moves all extremities well.  No clubbing, cyanosis, or edema.     Results Review:        Results from last 7 days   Lab Units 11/11/19  0404 11/10/19  1752   TROPONIN T ng/mL <0.010 <0.010                           I reviewed the patient's new clinical results.        Assessment:  1. Chest and arm pain  2. Coronary artery disease - status post CABG  3. Cath 12/28/2018 with 2/4 patent grafts, 60% mid LCx lesion  4. Peripheral vascular disease, status post aortobifemoral bypass   5. Ongoing tobacco use  6. Hyperlipidemia with severe hypertriglyceridemia  7. Hypertension   8. Diabetes     Plan:  -Troponin has been negative here.  EKG is abnormal with ST depression, but is unchanged from previous EKGs.  Discussed with the patient.  We will proceed with a Lexiscan Cardiolite stress test today.  She is unable to walk on a treadmill secondary to claudication and physical intolerance.  Careful attention to the left circumflex distribution should be considered given the 60% mid left circumflex lesion on her catheterization on 12/28/2018.    -Severe hypertriglyceridemia noted with a triglyceride level of 929 at [facility].  I am going to recheck her lipid panel today.   She will certainly need a triglyceride directed therapy at discharge.  She is currently only on Crestor.    -Smoking cessation is essential for her health in the future.  Counseled her on smoking cessation.    -Hold Lasix in case cardiac cath is needed later today.     Saurabh Camp MD  11/11/19  8:54 AM

## 2019-11-11 NOTE — PROGRESS NOTES
Discharge Planning Assessment  [facility]     Patient Name: Vandana Glass  MRN: [id number]  Today's Date: 11/11/2019    Admit Date: 11/10/2019    Discharge Needs Assessment     Row Name 11/11/19 1504       Living Environment    Lives With  child(neg[place]), adult    Name(s) of Who Lives With Patient  son    Current Living Arrangements  home/apartment/condo    Primary Care Provided by  self    Provides Primary Care For  no one    Family Caregiver if Needed  child(neg[place]), adult       Resource/Environmental Concerns    Resource/Environmental Concerns  none    Transportation Concerns  car, none       Transition Planning    Patient/Family Anticipates Transition to  home with family    Transportation Anticipated  family or friend will provide       Discharge Needs Assessment    Concerns to be Addressed  no discharge needs identified;denies needs/concerns at this time    Equipment Currently Used at Home  none    Equipment Needed After Discharge  none    Offered/Gave Vendor List  yes        Discharge Plan     Row Name 11/11/19 1505       Plan    Plan  Home w/ son's assistance if needed.    Plan Comments  Met in room with patient.  Introduced self and explained role.  Patient's son lives with her in [place].  Denies any needs.  Awaiting ST to be performed today.           Destination      No service coordination in this encounter.      Durable Medical Equipment      No service coordination in this encounter.      Dialysis/Infusion      No service coordination in this encounter.      Home Medical Care      No service coordination in this encounter.      Therapy      No service coordination in this encounter.      Community Resources      No service coordination in this encounter.        Expected Discharge Date and Time     Expected Discharge Date Expected Discharge Time    Nov 11-12, 2019         Demographic Summary     Row Name 11/11/19 1504       General Information    Admission Type  observation    Arrived From  home     Referral Source  admission list    Reason for Consult  discharge planning    Preferred Language  English     Used During This Interaction  no       Contact Information    Permission Granted to Share Info With  ;family/designee    Contact Information Obtained for          Functional Status     Row Name 11/11/19 1504       Functional Status    Usual Activity Tolerance  excellent    Current Activity Tolerance  good       Functional Status, IADL    Medications  independent    Meal Preparation  independent    Housekeeping  independent    Laundry  independent    Shopping  independent       Mental Status    General Appearance WDL  WDL       Mental Status Summary    Recent Changes in Mental Status/Cognitive Functioning  no changes              Kwan Joyce RN

## 2019-11-12 LAB
GLUCOSE BLDC GLUCOMTR-MCNC: 149 MG/DL (ref 70–130)
GLUCOSE BLDC GLUCOMTR-MCNC: 180 MG/DL (ref 70–130)

## 2019-11-12 PROCEDURE — 99153 MOD SED SAME PHYS/QHP EA: CPT | Performed by: INTERNAL MEDICINE

## 2019-11-12 PROCEDURE — G0378 HOSPITAL OBSERVATION PER HR: HCPCS

## 2019-11-12 PROCEDURE — 63710000001 INSULIN LISPRO (HUMAN) PER 5 UNITS: Performed by: INTERNAL MEDICINE

## 2019-11-12 PROCEDURE — 25010000002 FENTANYL CITRATE (PF) 100 MCG/2ML SOLUTION: Performed by: INTERNAL MEDICINE

## 2019-11-12 PROCEDURE — 93459 L HRT ART/GRFT ANGIO: CPT | Performed by: INTERNAL MEDICINE

## 2019-11-12 PROCEDURE — C1769 GUIDE WIRE: HCPCS | Performed by: INTERNAL MEDICINE

## 2019-11-12 PROCEDURE — 25010000002 MIDAZOLAM PER 1 MG: Performed by: INTERNAL MEDICINE

## 2019-11-12 PROCEDURE — C1887 CATHETER, GUIDING: HCPCS | Performed by: INTERNAL MEDICINE

## 2019-11-12 PROCEDURE — C1725 CATH, TRANSLUMIN NON-LASER: HCPCS | Performed by: INTERNAL MEDICINE

## 2019-11-12 PROCEDURE — C1874 STENT, COATED/COV W/DEL SYS: HCPCS | Performed by: INTERNAL MEDICINE

## 2019-11-12 PROCEDURE — 82962 GLUCOSE BLOOD TEST: CPT

## 2019-11-12 PROCEDURE — 99152 MOD SED SAME PHYS/QHP 5/>YRS: CPT | Performed by: INTERNAL MEDICINE

## 2019-11-12 PROCEDURE — 0 IOPAMIDOL PER 1 ML: Performed by: INTERNAL MEDICINE

## 2019-11-12 PROCEDURE — C1894 INTRO/SHEATH, NON-LASER: HCPCS | Performed by: INTERNAL MEDICINE

## 2019-11-12 PROCEDURE — C9600 PERC DRUG-EL COR STENT SING: HCPCS | Performed by: INTERNAL MEDICINE

## 2019-11-12 PROCEDURE — C9604 PERC D-E COR REVASC T CABG S: HCPCS | Performed by: INTERNAL MEDICINE

## 2019-11-12 PROCEDURE — 85347 COAGULATION TIME ACTIVATED: CPT

## 2019-11-12 PROCEDURE — 99225 PR SBSQ OBSERVATION CARE/DAY 25 MINUTES: CPT | Performed by: INTERNAL MEDICINE

## 2019-11-12 PROCEDURE — 92937 PRQ TRLUML REVSC CAB GRF 1: CPT | Performed by: INTERNAL MEDICINE

## 2019-11-12 PROCEDURE — 92928 PRQ TCAT PLMT NTRAC ST 1 LES: CPT | Performed by: INTERNAL MEDICINE

## 2019-11-12 PROCEDURE — 25010000002 HEPARIN (PORCINE) PER 1000 UNITS: Performed by: INTERNAL MEDICINE

## 2019-11-12 DEVICE — XIENCE SIERRA™ EVEROLIMUS ELUTING CORONARY STENT SYSTEM 2.75 MM X 18 MM / RAPID-EXCHANGE
Type: IMPLANTABLE DEVICE | Status: FUNCTIONAL
Brand: XIENCE SIERRA™

## 2019-11-12 DEVICE — XIENCE SIERRA™ EVEROLIMUS ELUTING CORONARY STENT SYSTEM 2.75 MM X 38 MM / RAPID-EXCHANGE
Type: IMPLANTABLE DEVICE | Status: FUNCTIONAL
Brand: XIENCE SIERRA™

## 2019-11-12 RX ORDER — MIDAZOLAM HYDROCHLORIDE 1 MG/ML
INJECTION INTRAMUSCULAR; INTRAVENOUS AS NEEDED
Status: DISCONTINUED | OUTPATIENT
Start: 2019-11-12 | End: 2019-11-12 | Stop reason: HOSPADM

## 2019-11-12 RX ORDER — FENTANYL CITRATE 50 UG/ML
INJECTION, SOLUTION INTRAMUSCULAR; INTRAVENOUS AS NEEDED
Status: DISCONTINUED | OUTPATIENT
Start: 2019-11-12 | End: 2019-11-12 | Stop reason: HOSPADM

## 2019-11-12 RX ORDER — CLOPIDOGREL BISULFATE 75 MG/1
TABLET ORAL AS NEEDED
Status: DISCONTINUED | OUTPATIENT
Start: 2019-11-12 | End: 2019-11-12 | Stop reason: HOSPADM

## 2019-11-12 RX ORDER — SODIUM CHLORIDE 9 MG/ML
100 INJECTION, SOLUTION INTRAVENOUS CONTINUOUS
Status: ACTIVE | OUTPATIENT
Start: 2019-11-12 | End: 2019-11-12

## 2019-11-12 RX ORDER — ACETAMINOPHEN 500 MG
1000 TABLET ORAL EVERY 6 HOURS PRN
Status: DISCONTINUED | OUTPATIENT
Start: 2019-11-12 | End: 2019-11-13 | Stop reason: HOSPADM

## 2019-11-12 RX ORDER — HEPARIN SODIUM 1000 [USP'U]/ML
INJECTION, SOLUTION INTRAVENOUS; SUBCUTANEOUS AS NEEDED
Status: DISCONTINUED | OUTPATIENT
Start: 2019-11-12 | End: 2019-11-12 | Stop reason: HOSPADM

## 2019-11-12 RX ORDER — LOSARTAN POTASSIUM 100 MG/1
100 TABLET ORAL DAILY
Status: DISCONTINUED | OUTPATIENT
Start: 2019-11-12 | End: 2019-11-13 | Stop reason: HOSPADM

## 2019-11-12 RX ORDER — LIDOCAINE HYDROCHLORIDE 20 MG/ML
INJECTION, SOLUTION INFILTRATION; PERINEURAL AS NEEDED
Status: DISCONTINUED | OUTPATIENT
Start: 2019-11-12 | End: 2019-11-12 | Stop reason: HOSPADM

## 2019-11-12 RX ADMIN — CLOPIDOGREL 75 MG: 75 TABLET, FILM COATED ORAL at 08:46

## 2019-11-12 RX ADMIN — ASPIRIN 81 MG: 81 TABLET, COATED ORAL at 08:46

## 2019-11-12 RX ADMIN — CITALOPRAM 40 MG: 40 TABLET, FILM COATED ORAL at 08:46

## 2019-11-12 RX ADMIN — METOPROLOL TARTRATE 25 MG: 25 TABLET ORAL at 20:36

## 2019-11-12 RX ADMIN — INSULIN LISPRO 20 UNITS: 100 INJECTION, SOLUTION INTRAVENOUS; SUBCUTANEOUS at 18:23

## 2019-11-12 RX ADMIN — ACETAMINOPHEN 1000 MG: 500 TABLET, FILM COATED ORAL at 20:36

## 2019-11-12 RX ADMIN — SODIUM CHLORIDE 75 ML/HR: 9 INJECTION, SOLUTION INTRAVENOUS at 17:24

## 2019-11-12 RX ADMIN — PANTOPRAZOLE SODIUM 40 MG: 40 TABLET, DELAYED RELEASE ORAL at 08:49

## 2019-11-12 RX ADMIN — SODIUM CHLORIDE 75 ML/HR: 9 INJECTION, SOLUTION INTRAVENOUS at 02:40

## 2019-11-12 RX ADMIN — ROSUVASTATIN CALCIUM 20 MG: 20 TABLET, FILM COATED ORAL at 20:36

## 2019-11-12 RX ADMIN — METOPROLOL TARTRATE 25 MG: 25 TABLET ORAL at 08:46

## 2019-11-12 RX ADMIN — LOSARTAN POTASSIUM 100 MG: 100 TABLET, FILM COATED ORAL at 08:46

## 2019-11-12 NOTE — PLAN OF CARE
Problem: Patient Care Overview  Goal: Plan of Care Review  Outcome: Ongoing (interventions implemented as appropriate)   11/12/19 0304   Coping/Psychosocial   Plan of Care Reviewed With patient   Plan of Care Review   Progress improving   OTHER   Outcome Summary Pt. had stress test done yesterday 11/11/19 found abnormalities plan to perform heart cath today. Running NSR on heart monitor.

## 2019-11-12 NOTE — PROGRESS NOTES
LOS: 1 day   Patient Care Team:  Duglas Dixon MD as PCP - General (Internal Medicine)  Duglas Dixon MD as PCP - Claims Attributed    Chief Complaint: Follow-up for chest pain, coronary artery disease, abnormal nuclear stress test.    Interval History: No chest pain overnight.  BP uncontrolled.  Shortness of breath at baseline and stable.    Vital Signs:  Temp:  [98.1 °F (36.7 °C)-98.3 °F (36.8 °C)] 98.1 °F (36.7 °C)  Heart Rate:  [51-81] 59  Resp:  [12-14] 14  BP: (135-163)/(65-84) 157/80    Intake/Output Summary (Last 24 hours) at 11/12/2019 0834  Last data filed at 11/12/2019 0240  Gross per 24 hour   Intake 1600 ml   Output --   Net 1600 ml       Physical Exam:   General Appearance:    No acute distress, alert and oriented x4   Lungs:     Bilateral rhonci    Heart:    Regular rhythm and normal rate.  No murmurs, gallops, or       rubs.   Abdomen:     Soft, non-tender, non-distended.    Extremities:   Moves all extremities well.  No clubbing, cyanosis, or edema.     Results Review:    Results from last 7 days   Lab Units 11/11/19  0905   SODIUM mmol/L 143   POTASSIUM mmol/L 4.2   CHLORIDE mmol/L 102   CO2 mmol/L 30.4*   BUN mg/dL 18   CREATININE mg/dL 0.82   GLUCOSE mg/dL 106*   CALCIUM mg/dL 9.1     Results from last 7 days   Lab Units 11/11/19  0404 11/10/19  1752   TROPONIN T ng/mL <0.010 <0.010     Results from last 7 days   Lab Units 11/11/19  0900   WBC 10*3/mm3 7.24   HEMOGLOBIN g/dL 14.7   HEMATOCRIT % 42.7   PLATELETS 10*3/mm3 193         Results from last 7 days   Lab Units 11/11/19  0905   CHOLESTEROL mg/dL 112         Results from last 7 days   Lab Units 11/11/19  0905   CHOLESTEROL mg/dL 112   TRIGLYCERIDES mg/dL 144   HDL CHOL mg/dL 42   LDL CHOL mg/dL 41       I reviewed the patient's new clinical results.        Assessment:  1. Chest and arm pain (anginal equivalent)  2. Abnormal Lexiscan Cardiolite stress test (moderate ischemia in lateral wall and apex)  3. Coronary artery disease -  status post CABG  4. Cath 12/28/2018 with 2/4 patent grafts, 60% mid LCx lesion  5. Peripheral vascular disease, status post aortobifemoral bypass   6. Ongoing tobacco use  7. Hypertension (uncontrolled)  8. Hyperlipidemia  9. Diabetes    Plan:  -Reviewed stress images personally - agree there is moderate size and moderate intensity ischemia mainly in the lateral wall (and to a lesser degree in the apex).  Suspect native LCx may be culprit.    -Plan for left heart cath today - hydrated overnight.  Lasix held.    -BP uncontrolled - increase losartan to 100 mg per day.  Can add Norvasc in future if needed.    -Continue Metoprolol, aspirin, Plavix, and Crestor.    -Triglycerides at [facility] were markedly elevated, but normal on lipid panel here.    -Smoking cessation essential - counseled.    Saurabh Camp MD  11/12/19  8:34 AM

## 2019-11-13 ENCOUNTER — TELEPHONE (OUTPATIENT)
Dept: CARDIAC REHAB | Facility: HOSPITAL | Age: 59
End: 2019-11-13

## 2019-11-13 VITALS
DIASTOLIC BLOOD PRESSURE: 81 MMHG | RESPIRATION RATE: 17 BRPM | WEIGHT: 186.95 LBS | SYSTOLIC BLOOD PRESSURE: 114 MMHG | OXYGEN SATURATION: 96 % | BODY MASS INDEX: 30.05 KG/M2 | TEMPERATURE: 97.8 F | HEART RATE: 65 BPM | HEIGHT: 66 IN

## 2019-11-13 LAB
ACT BLD: 235 SECONDS (ref 82–152)
ACT BLD: 274 SECONDS (ref 82–152)
ACT BLD: 285 SECONDS (ref 82–152)
ANION GAP SERPL CALCULATED.3IONS-SCNC: 11.6 MMOL/L (ref 5–15)
BUN BLD-MCNC: 15 MG/DL (ref 6–20)
BUN/CREAT SERPL: 16.5 (ref 7–25)
CALCIUM SPEC-SCNC: 8.8 MG/DL (ref 8.6–10.5)
CHLORIDE SERPL-SCNC: 96 MMOL/L (ref 98–107)
CO2 SERPL-SCNC: 25.4 MMOL/L (ref 22–29)
CREAT BLD-MCNC: 0.91 MG/DL (ref 0.57–1)
DEPRECATED RDW RBC AUTO: 44.5 FL (ref 37–54)
ERYTHROCYTE [DISTWIDTH] IN BLOOD BY AUTOMATED COUNT: 13.4 % (ref 12.3–15.4)
GFR SERPL CREATININE-BSD FRML MDRD: 63 ML/MIN/1.73
GLUCOSE BLD-MCNC: 265 MG/DL (ref 65–99)
GLUCOSE BLDC GLUCOMTR-MCNC: 224 MG/DL (ref 70–130)
HCT VFR BLD AUTO: 38.5 % (ref 34–46.6)
HGB BLD-MCNC: 12.8 G/DL (ref 12–15.9)
MCH RBC QN AUTO: 29.6 PG (ref 26.6–33)
MCHC RBC AUTO-ENTMCNC: 33.2 G/DL (ref 31.5–35.7)
MCV RBC AUTO: 89.1 FL (ref 79–97)
PLATELET # BLD AUTO: 165 10*3/MM3 (ref 140–450)
PMV BLD AUTO: 9.9 FL (ref 6–12)
POTASSIUM BLD-SCNC: 4.2 MMOL/L (ref 3.5–5.2)
RBC # BLD AUTO: 4.32 10*6/MM3 (ref 3.77–5.28)
SODIUM BLD-SCNC: 133 MMOL/L (ref 136–145)
TROPONIN T SERPL-MCNC: <0.01 NG/ML (ref 0–0.03)
WBC NRBC COR # BLD: 8.47 10*3/MM3 (ref 3.4–10.8)

## 2019-11-13 PROCEDURE — 63710000001 INSULIN LISPRO (HUMAN) PER 5 UNITS: Performed by: INTERNAL MEDICINE

## 2019-11-13 PROCEDURE — 82962 GLUCOSE BLOOD TEST: CPT

## 2019-11-13 PROCEDURE — 85027 COMPLETE CBC AUTOMATED: CPT | Performed by: INTERNAL MEDICINE

## 2019-11-13 PROCEDURE — G0378 HOSPITAL OBSERVATION PER HR: HCPCS

## 2019-11-13 PROCEDURE — 84484 ASSAY OF TROPONIN QUANT: CPT | Performed by: INTERNAL MEDICINE

## 2019-11-13 PROCEDURE — 80048 BASIC METABOLIC PNL TOTAL CA: CPT | Performed by: INTERNAL MEDICINE

## 2019-11-13 PROCEDURE — 93005 ELECTROCARDIOGRAM TRACING: CPT | Performed by: INTERNAL MEDICINE

## 2019-11-13 PROCEDURE — 93010 ELECTROCARDIOGRAM REPORT: CPT | Performed by: INTERNAL MEDICINE

## 2019-11-13 PROCEDURE — 99217 PR OBSERVATION CARE DISCHARGE MANAGEMENT: CPT | Performed by: NURSE PRACTITIONER

## 2019-11-13 RX ORDER — METOPROLOL TARTRATE 50 MG/1
50 TABLET, FILM COATED ORAL 2 TIMES DAILY
Qty: 90 TABLET | Refills: 3 | Status: SHIPPED | OUTPATIENT
Start: 2019-11-13 | End: 2020-07-01 | Stop reason: DRUGHIGH

## 2019-11-13 RX ORDER — LOSARTAN POTASSIUM 100 MG/1
100 TABLET ORAL DAILY
Qty: 90 TABLET | Refills: 3 | Status: SHIPPED | OUTPATIENT
Start: 2019-11-14

## 2019-11-13 RX ADMIN — ASPIRIN 81 MG: 81 TABLET, COATED ORAL at 09:05

## 2019-11-13 RX ADMIN — PANTOPRAZOLE SODIUM 40 MG: 40 TABLET, DELAYED RELEASE ORAL at 09:05

## 2019-11-13 RX ADMIN — METOPROLOL TARTRATE 25 MG: 25 TABLET ORAL at 09:05

## 2019-11-13 RX ADMIN — CITALOPRAM 40 MG: 40 TABLET, FILM COATED ORAL at 09:05

## 2019-11-13 RX ADMIN — INSULIN LISPRO 20 UNITS: 100 INJECTION, SOLUTION INTRAVENOUS; SUBCUTANEOUS at 07:13

## 2019-11-13 RX ADMIN — SODIUM CHLORIDE, PRESERVATIVE FREE 10 ML: 5 INJECTION INTRAVENOUS at 09:08

## 2019-11-13 RX ADMIN — CLOPIDOGREL 75 MG: 75 TABLET, FILM COATED ORAL at 09:05

## 2019-11-13 RX ADMIN — LOSARTAN POTASSIUM 100 MG: 100 TABLET, FILM COATED ORAL at 09:05

## 2019-11-13 NOTE — DISCHARGE INSTR - ACTIVITY
1. No submerging procedure site below water for 7-10 days.  2. No lifting objects greater than 1 lbs for 3 days.  3. If groin site used, avoid climbing several flights of stairs or sitting for longer than 2 hours at a time for the next 24 hours.   4. Monitor puncture site for bleeding and/or knots;. If bleeding should occur at the groin site: lie flat, apply pressure and return to the ER. If bleeding should occur at the wrist site, apply pressure and return to the ER.  5.  You may apply a DRY Band-Aid over the puncture site if needed. Do not apply any lotions, salves or ointments to site.  6. No driving for 3 days.  7. Return to ER for recurrent symptoms.  8. No smoking.  9. Take all medications as prescribed.

## 2019-11-13 NOTE — DISCHARGE SUMMARY
[facility]      Patient Name: Vandana Glass  :1960  59 y.o.  LOS: 1  Encounter Provider: ALIA Loredo      Date of Admission: 11/10/2019    Date of Discharge:  2019    Treatment Team:  Patient Care Team:  Duglas Dixon MD as PCP - General (Internal Medicine)  Duglas Dixon MD as PCP - Claims Attributed    Discharge Condition: Stable  Discharge Diagnosis:    Coronary artery disease involving native coronary artery of native heart with angina pectoris (CMS/HCC)    Type 2 diabetes mellitus (CMS/[facility])    Hypertension    Coronary artery disease      History of Present Illness:  Vandana Glass is a 59 y.o. female who was admitted on 11/10/2019 with chest pain.  Patient follows Dr. Camp in office.  She has history of CAD with history of CABG.  Patient presented for evaluation of chest pain that radiated into the jaw and bilateral arms.  She was seen at [facility] and initial evaluation was negative.  She was admitted for further evaluation and second troponin came back positive at 0.13.    Hospital Course:   Patient was admitted for further evaluation of elevated troponin and chest pain.  Troponins at our facility trended to be negative.  Patient had lipid panel which revealed total cholesterol 112, triglycerides 144, HDL 42, LDL 41.  Patient had myocardial perfusion stress test with Lexiscan on  which revealed medium sized, moderately severe area of ischemia in the apex and lateral wall.    Patient subsequently underwent left diagnostic cardiac catheterization on  which revealed a right dominant system.  Left main was normal, LAD with luminal irregularities throughout the LAD and diagonals.  The circumflex is moderate in size in the proximal portion of the vessel contains a 90% stenosis which was treated with a drug-eluting stent.  RCA was 100% occluded at the ostium.  Saphenous vein graft to RV marginal was patent.  Saphenous vein graft to mid marginal had a  70% stenosis that was treated with drug-eluting stents.    Patient has been started on DAPT with Plavix and aspirin.  She will continue GDMT with losartan, metoprolol tartrate, Crestor.  She is pain-free on date of discharge.  Patient is typically followed at [facility].  She will follow-up with Dr. Lg Camp in 4-6 weeks for reevaluation.      Objective:  Temp:  [97.3 °F (36.3 °C)-98.6 °F (37 °C)] 97.8 °F (36.6 °C)  Heart Rate:  [59-94] 65  Resp:  [14-18] 17  BP: (114-156)/(58-95) 114/81    Intake/Output Summary (Last 24 hours) at 11/13/2019 0916  Last data filed at 11/13/2019 0730  Gross per 24 hour   Intake 360 ml   Output --   Net 360 ml     Body mass index is 30.05 kg/m².      11/12/19  1434 11/12/19  1912   Weight: 84.8 kg (187 lb) 84.8 kg (186 lb 15.2 oz)     Weight change:     Physical Exam   Constitutional: She is oriented to person, place, and time. Vital signs are normal. She appears well-developed and well-nourished. She is active.   Eyes: Conjunctivae are normal.   Neck: Carotid bruit is not present.   Cardiovascular: Normal rate, regular rhythm and normal heart sounds.   Pulmonary/Chest: Breath sounds normal.   Abdominal: Normal appearance.   Musculoskeletal:   No cyanosis, clubbing, edema  Normal ROM   Neurological: She is alert and oriented to person, place, and time. GCS eye subscore is 4. GCS verbal subscore is 5. GCS motor subscore is 6.   Skin: Skin is warm, dry and intact.   Right radial cath site well healed without erythema or ecchymosis, palpable proximal and distal pulses, good capillary refill, good motor function of hand, no thrill or bruit detected, site is soft and non-tender with no hematoma, no sensory deficits noted.     Psychiatric: She has a normal mood and affect. Her speech is normal and behavior is normal. Judgment and thought content normal. Cognition and memory are normal.       Procedures Performed:  Procedure(s):  Coronary angiography  Percutaneous Coronary Intervention          Pertinent Test Results:  Results from last 7 days   Lab Units 11/13/19  0423 11/11/19  0905   SODIUM mmol/L 133* 143   POTASSIUM mmol/L 4.2 4.2   CHLORIDE mmol/L 96* 102   CO2 mmol/L 25.4 30.4*   BUN mg/dL 15 18   CREATININE mg/dL 0.91 0.82   GLUCOSE mg/dL 265* 106*   CALCIUM mg/dL 8.8 9.1     Results from last 7 days   Lab Units 11/13/19  0423 11/11/19  0404 11/10/19  1752   TROPONIN T ng/mL <0.010 <0.010 <0.010     Results from last 7 days   Lab Units 11/13/19  0423 11/11/19  0900   WBC 10*3/mm3 8.47 7.24   HEMOGLOBIN g/dL 12.8 14.7   HEMATOCRIT % 38.5 42.7   PLATELETS 10*3/mm3 165 193             Results from last 7 days   Lab Units 11/11/19  0905   CHOLESTEROL mg/dL 112   TRIGLYCERIDES mg/dL 144   HDL CHOL mg/dL 42               Discharge Diet:    Dietary Orders (From admission, onward)    Start     Ordered    11/12/19 1708  Diet Regular; Cardiac  Diet Effective Now     Question Answer Comment   Diet Texture / Consistency Regular    Common Modifiers Cardiac        11/12/19 1707          Activity at Discharge:       Routine post cardiac catheterization/PCI discharge home care instructions:    1. No submerging procedure site below water for 7-10 days.  2. No lifting objects greater than 1 lbs for 3 days.  3. If groin site used, avoid climbing several flights of stairs or sitting for longer than 2 hours at a time for the next 24 hours.   4. Monitor puncture site for bleeding and/or knots;. If bleeding should occur at the groin site: lie flat, apply pressure and return to the ER. If bleeding should occur at the wrist site, apply pressure and return to the ER.  5.  You may apply a DRY Band-Aid over the puncture site if needed. Do not apply any lotions, salves or ointments to site.  6. No driving for 3 days.  7. Return to ER for recurrent symptoms.  8. No smoking.  9. Take all medications as prescribed.       Discharge disposition: home     Discharge Medications:     Discharge Medications      Changes to  Medications      Instructions Start Date   losartan 100 MG tablet  Commonly known as:  COZAAR  What changed:    · medication strength  · how much to take   100 mg, Oral, Daily   Start Date:  11/14/2019     metoprolol tartrate 50 MG tablet  Commonly known as:  LOPRESSOR  What changed:    · how much to take  · when to take this   50 mg, Oral, 2 Times Daily         Continue These Medications      Instructions Start Date   aspirin 81 MG tablet   1 tablet, Oral, Daily      citalopram 40 MG tablet  Commonly known as:  CeleXA   1 tablet, Oral, Daily      clopidogrel 75 MG tablet  Commonly known as:  PLAVIX   1 tablet, Oral, Daily      furosemide 40 MG tablet  Commonly known as:  LASIX   1 tablet, Oral, Daily      GLUCOPHAGE 1000 MG tablet  Generic drug:  metFORMIN   1 tablet, Oral, 2 Times Daily      HUMALOG 100 UNIT/ML injection  Generic drug:  insulin lispro   20 Units, Subcutaneous, 3 Times Daily Before Meals      NITROSTAT 0.4 MG SL tablet  Generic drug:  nitroglycerin   1 tablet, Sublingual, As Needed      OZEMPIC SC   0.5 mg, Subcutaneous, Weekly      pantoprazole 40 MG EC tablet  Commonly known as:  PROTONIX   1 tablet, Oral, Daily      rosuvastatin 40 MG tablet  Commonly known as:  CRESTOR   No dose, route, or frequency recorded.      TRESIBA FLEXTOUCH SC   50 Units, Subcutaneous, 2 Times Daily               Follow-up:  Follow-up Information     Duglas Dixon MD Follow up.    Specialty:  Internal Medicine  Contact information:  [address] [place] [address] [phone]             Shawna Kaiser, APRJADE. Schedule an appointment as soon as possible for a visit in 1 week(s).    Specialties:  Cardiology, Nurse Practitioner  Contact information:  [address] [facility] [facility] [address]  [place] [id number] [phone]             Saurabh Camp MD. Schedule an appointment as soon as possible for a visit in 6 week(s).    Specialty:  Cardiology  Contact information:  [address] [place]  [address]  [phone]                   No future appointments.  Additional Instructions for the Follow-ups that You Need to Schedule     Ambulatory Referral to Cardiac Rehab   As directed            Time Spent on Discharge:  Greater than 30 min    Shawna Kaiser, ALIA  11/13/19  9:16 AM    EMR Dragon/Transcription disclaimer:   Much of this encounter note is an electronic transcription/translation of spoken language to printed text. The electronic translation of spoken language may permit erroneous, or at times, nonsensical words or phrases to be inadvertently transcribed; Although I have reviewed the note for such errors, some may still exist.

## 2019-11-13 NOTE — TELEPHONE ENCOUNTER
Referral on work que from Dr. Solares for outpatient cardiac rehab.  Telephoned pt, but no answer and no VM option.  Pt apparently lives in [place], [place] and closest CR program would be a [facility] in [place].

## 2019-11-21 ENCOUNTER — OFFICE VISIT (OUTPATIENT)
Dept: CARDIOLOGY | Facility: CLINIC | Age: 59
End: 2019-11-21

## 2019-11-21 VITALS
HEART RATE: 60 BPM | HEIGHT: 66 IN | OXYGEN SATURATION: 99 % | WEIGHT: 189 LBS | DIASTOLIC BLOOD PRESSURE: 76 MMHG | BODY MASS INDEX: 30.37 KG/M2 | SYSTOLIC BLOOD PRESSURE: 142 MMHG

## 2019-11-21 DIAGNOSIS — E11.59 TYPE 2 DIABETES MELLITUS WITH OTHER CIRCULATORY COMPLICATION, WITHOUT LONG-TERM CURRENT USE OF INSULIN (HCC): ICD-10-CM

## 2019-11-21 DIAGNOSIS — I25.810 CORONARY ARTERY DISEASE INVOLVING CORONARY BYPASS GRAFT OF NATIVE HEART WITHOUT ANGINA PECTORIS: Primary | ICD-10-CM

## 2019-11-21 DIAGNOSIS — I10 ESSENTIAL HYPERTENSION: ICD-10-CM

## 2019-11-21 PROCEDURE — 99407 BEHAV CHNG SMOKING > 10 MIN: CPT | Performed by: NURSE PRACTITIONER

## 2019-11-21 PROCEDURE — 99214 OFFICE O/P EST MOD 30 MIN: CPT | Performed by: NURSE PRACTITIONER

## 2019-11-21 NOTE — PROGRESS NOTES
[facility] CARDIOLOGY  [address]  [facility] [address]  Phone: [phone]      Patient Name: Vandana Glass  :1960  Age: 59 y.o.  Primary Cardiologist: Lg Camp MD  Encounter Provider:  ALIA Loredo      Chief Complaint:   Chief Complaint   Patient presents with   • Follow-up     1 week Hospital         HPI  Vandana Glass is a 59 y.o. female who presents today for hospital reevaluation.     Pt has a  history significant for CAD, hypertension, diabetes.    Patient hospitalized 11/10- for evaluation of angina.  Patient was admitted for further evaluation of elevated troponin and chest pain.  Troponins at our facility trended to be negative.  Patient had lipid panel which revealed total cholesterol 112, triglycerides 144, HDL 42, LDL 41.  Patient had myocardial perfusion stress test with Lexiscan on  which revealed medium sized, moderately severe area of ischemia in the apex and lateral wall.   Patient subsequently underwent left diagnostic cardiac catheterization on  which revealed a right dominant system.  Left main was normal, LAD with luminal irregularities throughout the LAD and diagonals.  The circumflex is moderate in size in the proximal portion of the vessel contains a 90% stenosis which was treated with a drug-eluting stent.  RCA was 100% occluded at the ostium.  Saphenous vein graft to RV marginal was patent.  Saphenous vein graft to mid marginal had a 70% stenosis that was treated with drug-eluting stents.  Patient has been started on DAPT with Plavix and aspirin.    Patient reports that she has not had any re-current chest pain. She has continued to have increased fatigue and shortness of breath.  She does have 4 grandchildren with URI's and she believes that she may have picked up some of their URI symptoms.  Tolerating all medications without complication.  She has not yet started cardiac rehab and would prefer  "[facility].  No complications from radial cath insertion site.  Denies near syncope, palpitations, lower extremity edema.  She continues to smoke and does have desire to stop, she is trying to wean off.      The following portions of the patient's history were reviewed and updated as appropriate: allergies, current medications, past family history, past medical history, past social history, past surgical history and problem list.      Review of Systems   Constitution: Positive for malaise/fatigue.   Eyes: Positive for blurred vision.   Cardiovascular: Positive for dyspnea on exertion. Negative for chest pain and leg swelling.   Respiratory: Positive for shortness of breath.    Gastrointestinal: Positive for nausea.   Neurological: Positive for excessive daytime sleepiness, headaches and paresthesias. Negative for light-headedness.   Psychiatric/Behavioral: Positive for depression. The patient is nervous/anxious.    All other systems reviewed and are negative.      OBJECTIVE:     Vitals:    11/21/19 1056   BP: 142/76   BP Location: Right arm   Patient Position: Sitting   Pulse: 60   SpO2: 99%   Weight: 85.7 kg (189 lb)   Height: 167.6 cm (66\")     Body mass index is 30.51 kg/m².    Physical Exam   Constitutional: She is oriented to person, place, and time. Vital signs are normal. She appears well-developed and well-nourished. She is active.   Eyes: Conjunctivae are normal.   Neck: Carotid bruit is not present.   Cardiovascular: Normal rate, regular rhythm and normal heart sounds.   Pulmonary/Chest: Breath sounds normal.   Abdominal: Normal appearance.   Musculoskeletal:   No cyanosis, clubbing, edema  Normal ROM   Neurological: She is alert and oriented to person, place, and time. GCS eye subscore is 4. GCS verbal subscore is 5. GCS motor subscore is 6.   Skin: Skin is warm, dry and intact.   Psychiatric: She has a normal mood and affect. Her speech is normal and behavior is normal. Judgment and thought content normal. " Cognition and memory are normal.       Procedures    Cardiac Procedures:  1. Cardiac catheterization on 12/28/2018.  Normal LV systolic function.  2 patent grafts.  Moderate mid circumflex stenosis.  Consider circumflex PCI for recurrent angina.  2. Myocardial perfusion stress test 11/10/2019.  medium sized, moderately severe area of ischemia in the apex and lateral wall.   3. Cardiac catheterization 11/11/2019. a right dominant system.  Left main was normal, LAD with luminal irregularities throughout the LAD and diagonals.  The circumflex is moderate in size in the proximal portion of the vessel contains a 90% stenosis which was treated with a drug-eluting stent.  RCA was 100% occluded at the ostium.  Saphenous vein graft to RV marginal was patent.  Saphenous vein graft to mid marginal had a 70% stenosis that was treated with drug-eluting stents.  Patient has been started on DAPT with Plavix and aspirin.           ASSESSMENT:      Diagnosis Plan   1. Coronary artery disease involving coronary bypass graft of native heart without angina pectoris  Ambulatory Referral to Cardiac Rehab   2. Essential hypertension     3. Type 2 diabetes mellitus with other circulatory complication, without long-term current use of insulin (CMS/[facility])           PLAN OF CARE:     1. CAD.  Patient with recent drug-eluting stents placed to the circumflex as well as saphenous vein graft to mid marginal.  Currently on DAPT with Plavix and aspirin.  Patient not having any bleeding complications.  She does continue to have some generalized fatigue and shortness of breath.  She does note that her grandchildren have upper respiratory infections that she believes she may be acquiring.  Referral placed to cardiac rehab to be done at [facility].  She will continue with goal-directed therapy with losartan, metoprolol, Crestor.  2. HTN.  Patient's blood pressure controlled in the office at 142/76.  She will continue current regimen.  3. Type 2  diabetes  4. Nicotine dependence.  Patient and I discussed importance of smoking cessation with cardiovascular health.  She states that she does have desire to stop smoking by [place].  Reports that she would like to stop cold turkey.  5. Follow-up in 6 weeks with Dr. Camp at [facility].      Vandana Glass is a current cigarettes user.  She currently smokes 1 pack of cigarettes per day for a duration of 15 years. I have educated her on the risk of diseases from using tobacco products such as cancer, COPD and heart diease.     I advised her to quit and she is willing to quit. We have discussed the following method/s for tobacco cessation:  Cold [place].  Together we have set a quit date for 1 month from today.  She will follow up with me in 6 weeks or sooner to check on her progress.    I spent >10 minutes counseling the patient.             Discharge Medications           Accurate as of 11/21/19 11:25 AM. If you have any questions, ask your nurse or doctor.               Continue These Medications      Instructions Start Date   aspirin 81 MG tablet   1 tablet, Oral, Daily      citalopram 40 MG tablet  Commonly known as:  CeleXA   1 tablet, Oral, Daily      clopidogrel 75 MG tablet  Commonly known as:  PLAVIX   1 tablet, Oral, Daily      furosemide 40 MG tablet  Commonly known as:  LASIX   1 tablet, Oral, Daily      GLUCOPHAGE 1000 MG tablet  Generic drug:  metFORMIN   1 tablet, Oral, 2 Times Daily      HUMALOG 100 UNIT/ML injection  Generic drug:  insulin lispro   20 Units, Subcutaneous, 3 Times Daily Before Meals      losartan 100 MG tablet  Commonly known as:  COZAAR   100 mg, Oral, Daily      metoprolol tartrate 50 MG tablet  Commonly known as:  LOPRESSOR   50 mg, Oral, 2 Times Daily      NITROSTAT 0.4 MG SL tablet  Generic drug:  nitroglycerin   1 tablet, Sublingual, As Needed      OZEMPIC SC   0.5 mg, Subcutaneous, Weekly      pantoprazole 40 MG EC tablet  Commonly known as:  PROTONIX   1 tablet, Oral, Daily       rosuvastatin 40 MG tablet  Commonly known as:  CRESTOR   No dose, route, or frequency recorded.      TRESIBA FLEXTOUCH SC   50 Units, Subcutaneous, 2 Times Daily             Thank you for allowing me to participate in the care of your patient,         ALIA Loredo  [facility]  11/21/19  11:25 AM    **Mathew Disclaimer:**  Much of this encounter note is an electronic transcription/translation of spoken language to printed text. The electronic translation of spoken language may permit erroneous, or at times, nonsensical words or phrases to be inadvertently transcribed. Although I have reviewed the note for such errors, some may still exist.

## 2019-12-18 ENCOUNTER — OFFICE VISIT (OUTPATIENT)
Dept: CARDIOLOGY | Facility: CLINIC | Age: 59
End: 2019-12-18

## 2019-12-18 VITALS
BODY MASS INDEX: 30.6 KG/M2 | OXYGEN SATURATION: 96 % | DIASTOLIC BLOOD PRESSURE: 74 MMHG | HEART RATE: 67 BPM | WEIGHT: 190.4 LBS | SYSTOLIC BLOOD PRESSURE: 128 MMHG | HEIGHT: 66 IN

## 2019-12-18 DIAGNOSIS — I10 ESSENTIAL HYPERTENSION: ICD-10-CM

## 2019-12-18 DIAGNOSIS — Z79.4 TYPE 2 DIABETES MELLITUS WITH OTHER CIRCULATORY COMPLICATION, WITH LONG-TERM CURRENT USE OF INSULIN (HCC): ICD-10-CM

## 2019-12-18 DIAGNOSIS — E11.59 TYPE 2 DIABETES MELLITUS WITH OTHER CIRCULATORY COMPLICATION, WITH LONG-TERM CURRENT USE OF INSULIN (HCC): ICD-10-CM

## 2019-12-18 DIAGNOSIS — I25.708 CORONARY ARTERY DISEASE OF BYPASS GRAFT OF NATIVE HEART WITH STABLE ANGINA PECTORIS (HCC): Primary | ICD-10-CM

## 2019-12-18 PROCEDURE — 99214 OFFICE O/P EST MOD 30 MIN: CPT | Performed by: INTERNAL MEDICINE

## 2019-12-18 RX ORDER — CYCLOBENZAPRINE HCL 10 MG
10 TABLET ORAL 3 TIMES DAILY
Refills: 0 | COMMUNITY
Start: 2019-12-02 | End: 2020-07-01 | Stop reason: DRUGHIGH

## 2019-12-18 RX ORDER — TRAMADOL HYDROCHLORIDE 50 MG/1
TABLET ORAL
COMMUNITY
Start: 2019-12-02 | End: 2020-07-01 | Stop reason: DRUGHIGH

## 2020-01-15 NOTE — PROGRESS NOTES
Date of Office Visit: 2019  Encounter Provider: Saurabh Camp MD  Place of Service: [facility] CARDIOLOGY  Patient Name: Vandana Glass  :1960    Chief complaint: Follow-up for coronary artery disease, hypertension, diabetes,   peripheral vascular disease.    History of Present Illness:    I had the pleasure of seeing the patient in cardiology office on 2019.  She is a very pleasant 59 year-old white female with a history of coronary artery disease, peripheral vascular disease, hypertension, diabetes who presents for follow-up.  Patient has formerly followed with Dr. Parra in our group.    The patient has a history of remote coronary artery bypass grafting.  I do not have the initial details or operative report, although 2 out of 4 of her bypass grafts were occluded by heart catheterization in 2018.  She also has a history of peripheral vascular disease, and underwent remote aortobifemoral bypass.  She was admitted to [facility] in 2019 with recurrent chest and arm pain, which has been her anginal equivalent in the past.  She had an abnormal Lexiscan Cardiolite stress test which showed moderate ischemia in the lateral wall and apex.  She subsequently underwent a left heart catheterization on 2018 by Dr. Solares.  This showed a 90% lesion in the mid left circumflex, occluded high and mid marginals at the ostium, and a 100% occlusion of the RCA at the ostium.  The LAD had only luminal irregularities.  The SVG to RV marginal was widely patent, although the SVG to mid marginal had a distal 70% stenosis.  The patient subsequently underwent placement of a 2.75 x 38 mm Xience JORGE to the mid left circumflex coronary artery at that time.     The patient presents today for follow-up.  She is going to start cardiac rehab.  She is still smoking, although she gives me a quit date of Carmen.  She has not had further chest discomfort.  She still has  baseline shortness of breath, although this has not changed.  She is taking aspirin and Plavix without any difficulty.    Past Medical History:   Diagnosis Date   • Coronary artery disease    • Hyperlipidemia    • Hypertension    • Peripheral arterial disease (CMS/[facility])     Status post aortobifemoral bypass   • Type 2 diabetes mellitus (CMS/[facility])        Past Surgical History:   Procedure Laterality Date   • APPENDECTOMY     • CARDIAC CATHETERIZATION     • CARDIAC CATHETERIZATION N/A 12/28/2018    Procedure: Left ventriculography;  Surgeon: Fan Parra MD;  Location:  [facility];  Service: Cardiovascular   • CARDIAC CATHETERIZATION N/A 12/28/2018    Procedure: Coronary angiography;  Surgeon: Fan Parra MD;  Location:  [facility];  Service: Cardiovascular   • CARDIAC CATHETERIZATION  12/28/2018    Procedure: Saphenous Vein Graft;  Surgeon: Fan Parra MD;  Location:  [facility];  Service: Cardiovascular   • CARDIAC CATHETERIZATION N/A 12/28/2018    Procedure: Left Heart Cath;  Surgeon: Fan Parra MD;  Location:  [facility];  Service: Cardiovascular   • CARDIAC CATHETERIZATION N/A 11/12/2019    Procedure: Coronary angiography;  Surgeon: Blake Solares MD;  Location:  [facility];  Service: Cardiovascular   • CARDIAC CATHETERIZATION N/A 11/12/2019    Procedure: Stent JORGE bypass graft;  Surgeon: Blake Solares MD;  Location:  [facility];  Service: Cardiovascular   • CARDIAC CATHETERIZATION N/A 11/12/2019    Procedure: Stent JORGE coronary;  Surgeon: Blake Solares MD;  [facility];  Service: Cardiovascular   • CARDIAC CATHETERIZATION N/A 11/12/2019    Procedure: Saphenous Vein Graft;  Surgeon: Blake Solares MD;  [facility];  Service: Cardiovascular   • CARDIAC SURGERY     • CORONARY ARTERY BYPASS GRAFT     • HYSTERECTOMY     • IL RT/LT HEART  CATHETERS N/A 11/12/2019    Procedure: Percutaneous Coronary Intervention;  Surgeon: Blake Solares MD;  Location: [facility] INVASIVE LOCATION;  Service: Cardiovascular   • TUBAL ABDOMINAL LIGATION     • VASCULAR SURGERY         Current Outpatient Medications on File Prior to Visit   Medication Sig Dispense Refill   • aspirin 81 MG tablet Take 1 tablet by mouth daily.     • citalopram (CeleXA) 40 MG tablet Take 1 tablet by mouth daily.     • clopidogrel (PLAVIX) 75 MG tablet Take 1 tablet by mouth daily.     • cyclobenzaprine (FLEXERIL) 10 MG tablet Take 10 mg by mouth 3 (Three) Times a Day.  0   • furosemide (LASIX) 40 MG tablet Take 1 tablet by mouth daily.     • Insulin Degludec (TRESIBA FLEXTOUCH SC) Inject 50 Units under the skin into the appropriate area as directed 2 (Two) Times a Day.     • Insulin Degludec (TRESIBA FLEXTOUCH) 200 UNIT/ML solution pen-injector pen injection      • insulin lispro (HUMALOG) 100 UNIT/ML injection Inject 20 Units under the skin into the appropriate area as directed 3 (Three) Times a Day Before Meals.     • losartan (COZAAR) 100 MG tablet Take 1 tablet by mouth Daily. 90 tablet 3   • metFORMIN (GLUCOPHAGE) 1000 MG tablet Take 1 tablet by mouth 2 (two) times a day.     • metoprolol tartrate (LOPRESSOR) 50 MG tablet Take 1 tablet by mouth 2 (Two) Times a Day. 90 tablet 3   • nitroglycerin (NITROSTAT) 0.4 MG SL tablet Place 1 tablet under the tongue as needed.     • pantoprazole (PROTONIX) 40 MG EC tablet Take 1 tablet by mouth daily.     • rosuvastatin (CRESTOR) 40 MG tablet      • Semaglutide (OZEMPIC SC) Inject 0.5 mg under the skin into the appropriate area as directed 1 (One) Time Per Week.     • traMADol (ULTRAM) 50 MG tablet        No current facility-administered medications on file prior to visit.      Allergies as of 12/18/2019 - Reviewed 11/21/2019   Allergen Reaction Noted   • Penicillins  09/15/2016   • Venlafaxine  09/15/2016     Social History     Socioeconomic  "History   • Marital status:      Spouse name: Not on file   • Number of children: Not on file   • Years of education: Not on file   • Highest education level: Not on file   Tobacco Use   • Smoking status: Current Every Day Smoker     Packs/day: 1.00   • Smokeless tobacco: Never Used   Substance and Sexual Activity   • Alcohol use: No   • Drug use: Not Currently   • Sexual activity: Defer     History reviewed. No pertinent family history.    Review of Systems   Constitution: Positive for malaise/fatigue.   Cardiovascular: Positive for dyspnea on exertion and palpitations.   Respiratory: Positive for shortness of breath.    Musculoskeletal: Positive for joint pain.   All other systems reviewed and are negative.     Objective:     Vitals:    12/18/19 1206   BP: 128/74   Pulse: 67   SpO2: 96%   Weight: 86.4 kg (190 lb 6.4 oz)   Height: 167.6 cm (65.98\")     Body mass index is 30.75 kg/m².    Physical Exam   Constitutional: She is oriented to person, place, and time. She appears well-developed and well-nourished.   HENT:   Head: Normocephalic and atraumatic.   Eyes: Conjunctivae are normal.   Neck: Neck supple.   Cardiovascular: Normal rate and regular rhythm. Exam reveals no gallop and no friction rub.   No murmur heard.  Pulmonary/Chest: Effort normal. She has decreased breath sounds.   Abdominal: Soft. There is no tenderness.   Musculoskeletal: She exhibits no edema.   Neurological: She is alert and oriented to person, place, and time.   Skin: Skin is warm.   Psychiatric: She has a normal mood and affect. Her behavior is normal.     Lab Review:   Procedures    Cardiac Procedures:  1.  Left heart catheterization on 11/12/2019 by Dr. Solares: The left main was normal.  The LAD was large and had luminal irregularities throughout.  The left circumflex had a 90% mid lesion.  The high and mid marginals were occluded at the ostium.  The RCA was 100% occluded at the ostium.  The SVG to RV marginal branch was widely " patent.  The SVG to mid marginal branch had a distal 70% stenosis.  2 other bypass grafts were chronically occluded.  The patient underwent placement of a 2.75 x 38 mm Xience JORGE to the mid left circumflex at that time.    Assessment:       Diagnosis Plan   1. Coronary artery disease of bypass graft of native heart with stable angina pectoris (CMS/[facility])     2. Essential hypertension     3. Type 2 diabetes mellitus with other circulatory complication, with long-term current use of insulin (CMS/[facility])       Plan:       She is much better after placement of the recent stent.  Again, I strongly encouraged her to quit smoking.  She told me that she is planning on quitting at [place].  I feel this is the basis for many of her vascular issues.  She will continue on aspirin and Plavix indefinitely.  She appears euvolemic on the Lasix at 40 mg/day.  Her blood pressure is controlled on the losartan at 100 mg/day and the metoprolol at 50 mg twice a day.  She is going to start cardiac rehab, and her blood pressure will also be monitored there as well.  She will continue on the Crestor at 40 mg/day.  I will have her follow-up with me in 6 months.    I spent 30 minutes in the care of the patient.  Greater than 50% of this time was spent in face-to-face counseling with the patient regarding her coronary artery disease, smoking cessation, and hypertension management.

## 2020-06-16 RX ORDER — FERROUS SULFATE 325(65) MG
TABLET ORAL EVERY 24 HOURS
COMMUNITY
Start: 2017-04-10 | End: 2020-07-01 | Stop reason: DRUGHIGH

## 2020-06-16 RX ORDER — FUROSEMIDE 40 MG/1
TABLET ORAL EVERY 24 HOURS
COMMUNITY

## 2020-06-16 RX ORDER — IRON POLYSACCHARIDE COMPLEX 150 MG
CAPSULE ORAL EVERY 24 HOURS
Status: ON HOLD | COMMUNITY
Start: 2017-04-07 | End: 2022-02-16

## 2020-07-01 ENCOUNTER — OFFICE VISIT (OUTPATIENT)
Dept: CARDIOLOGY | Facility: CLINIC | Age: 60
End: 2020-07-01

## 2020-07-01 VITALS
WEIGHT: 190 LBS | HEIGHT: 66 IN | BODY MASS INDEX: 30.53 KG/M2 | DIASTOLIC BLOOD PRESSURE: 78 MMHG | OXYGEN SATURATION: 98 % | HEART RATE: 67 BPM | SYSTOLIC BLOOD PRESSURE: 140 MMHG

## 2020-07-01 DIAGNOSIS — I25.708 CORONARY ARTERY DISEASE OF BYPASS GRAFT OF NATIVE HEART WITH STABLE ANGINA PECTORIS (HCC): Primary | ICD-10-CM

## 2020-07-01 DIAGNOSIS — I10 ESSENTIAL HYPERTENSION: ICD-10-CM

## 2020-07-01 DIAGNOSIS — I73.9 PVD (PERIPHERAL VASCULAR DISEASE) (HCC): ICD-10-CM

## 2020-07-01 PROCEDURE — 99213 OFFICE O/P EST LOW 20 MIN: CPT | Performed by: INTERNAL MEDICINE

## 2020-07-01 RX ORDER — METOPROLOL TARTRATE 100 MG/1
TABLET ORAL
COMMUNITY

## 2020-07-01 RX ORDER — SEMAGLUTIDE 1.34 MG/ML
INJECTION, SOLUTION SUBCUTANEOUS
COMMUNITY
Start: 2020-05-14

## 2020-07-01 NOTE — PROGRESS NOTES
Date of Office Visit:  2020  Encounter Provider: Saurabh Camp MD  Place of Service: [facility] CARDIOLOGY  Patient Name: Vandana Glass  :1960    Chief complaint: Follow-up for coronary artery disease, hypertension, diabetes,   peripheral vascular disease.    History of Present Illness:    I had the pleasure of seeing the patient in cardiology office on 2020.  She is a very pleasant 60 year-old white female with a history of coronary artery disease, peripheral vascular disease, hypertension, diabetes who presents for follow-up.  The patient has formerly followed with Dr. Parra in our group.    The patient has a history of remote coronary artery bypass grafting.  I do not have the initial details or operative report, although 2 out of 4 of her bypass grafts were occluded by heart catheterization in 2018.  She also has a history of peripheral vascular disease, and underwent remote aortobifemoral bypass.  She was admitted to [facility] in 2019 with recurrent chest and arm pain, which has been her anginal equivalent in the past.  She had an abnormal Lexiscan Cardiolite stress test which showed moderate ischemia in the lateral wall and apex.  She subsequently underwent a left heart catheterization on 2018 by Dr. Solares.  This showed a 90% lesion in the mid left circumflex, occluded high and mid marginals at the ostium, and a 100% occlusion of the RCA at the ostium.  The LAD had only luminal irregularities.  The SVG to RV marginal was widely patent, although the SVG to mid marginal had a distal 70% stenosis.  The patient subsequently underwent placement of a 2.75 x 38 mm Xience JORGE to the mid left circumflex coronary artery at that time.     The patient presents today for follow-up.  Her main complaint is that she has been fatigued.  She does state that her blood pressure was high, and the metoprolol was increased to 100 mg twice a day.  However, the  fatigue has not really correlated to the increase in the metoprolol dose.  She has had some mild claudication in her left calf, which is chronic.  She has not had any chest pain or new shortness of breath.  She is still smoking, although she has cut down to 3/4 of a pack per day.      Past Medical History:   Diagnosis Date   • Coronary artery disease    • Hyperlipidemia    • Hypertension    • Peripheral arterial disease (CMS/[facility])     Status post aortobifemoral bypass   • Type 2 diabetes mellitus (CMS/[facility])        Past Surgical History:   Procedure Laterality Date   • APPENDECTOMY     • CARDIAC CATHETERIZATION     • CARDIAC CATHETERIZATION N/A 12/28/2018    Procedure: Left ventriculography;  Surgeon: Fan Parra MD;  Location: [facility] CATH INVASIVE LOCATION;  Service: Cardiovascular   • CARDIAC CATHETERIZATION N/A 12/28/2018    Procedure: Coronary angiography;  Surgeon: Fan Parra MD;  [facility] LOC[facility];  Service: Cardiovascular   • CARDIAC CATHETERIZATION  12/28/2018    Procedure: Saphenous Vein Graft;  Surgeon: Fan Parra MD;  Location: [facility] CATH INVASIVE LOCATION;  Service: Cardiovascular   • CARDIAC CATHETERIZATION N/A 12/28/2018    Procedure: Left Heart Cath;  Surgeon: Fan Parra MD;  [facility];  Service: Cardiovascular   • CARDIAC CATHETERIZATION N/A 11/12/2019    Procedure: Coronary angiography;  Surgeon: Blake Solares MD;  [facility];  Service: Cardiovascular   • CARDIAC CATHETERIZATION N/A 11/12/2019    Procedure: Stent JORGE bypass graft;  Surgeon: Blake Solares MD;  Location: [facility] CATH INVASIVE LOCATION;  Service: Cardiovascular   • CARDIAC CATHETERIZATION N/A 11/12/2019    Procedure: Stent JORGE coronary;  Surgeon: Blake Solares MD;  Location: [facility] CATH INVASIVE LOCATION;  Service: Cardiovascular   • CARDIAC CATHETERIZATION N/A 11/12/2019    Procedure: Saphenous Vein Graft;  Surgeon: Blake Solares MD;   Location: [facility] CATH INVASIVE LOCATION;  Service: Cardiovascular   • CARDIAC SURGERY     • CORONARY ARTERY BYPASS GRAFT     • HYSTERECTOMY     • ND RT/LT HEART CATHETERS N/A 11/12/2019    Procedure: Percutaneous Coronary Intervention;  Surgeon: Blake Solares MD;  Location: [facility] CATH INVASIVE LOCATION;  Service: Cardiovascular   • TUBAL ABDOMINAL LIGATION     • VASCULAR SURGERY         Current Outpatient Medications on File Prior to Visit   Medication Sig Dispense Refill   • metoprolol tartrate (LOPRESSOR) 100 MG tablet Take 100 mg by mouth 2 (Two) Times a Day.     • aspirin 81 MG tablet Take 1 tablet by mouth daily.     • citalopram (CeleXA) 40 MG tablet Take 1 tablet by mouth daily.     • clopidogrel (PLAVIX) 75 MG tablet Take 1 tablet by mouth daily.     • furosemide (LASIX) 40 MG tablet Daily.     • Insulin Degludec (TRESIBA FLEXTOUCH SC) Inject 50 Units under the skin into the appropriate area as directed 2 (Two) Times a Day.     • Insulin Degludec (TRESIBA FLEXTOUCH) 200 UNIT/ML solution pen-injector pen injection      • insulin lispro (HUMALOG) 100 UNIT/ML injection Inject 20 Units under the skin into the appropriate area as directed 3 (Three) Times a Day Before Meals.     • iron polysaccharides (Ferrex 150) 150 MG capsule Daily.     • losartan (COZAAR) 100 MG tablet Take 1 tablet by mouth Daily. 90 tablet 3   • metFORMIN (GLUCOPHAGE) 1000 MG tablet Take 1 tablet by mouth 2 (two) times a day.     • metFORMIN (Glucophage) 1000 MG tablet Every 12 (Twelve) Hours.     • nitroglycerin (NITROSTAT) 0.4 MG SL tablet Place 1 tablet under the tongue as needed.     • OZEMPIC, 1 MG/DOSE, 2 MG/1.5ML solution pen-injector      • pantoprazole (PROTONIX) 40 MG EC tablet Take 1 tablet by mouth daily.     • rosuvastatin (CRESTOR) 40 MG tablet      • Semaglutide (OZEMPIC SC) Inject 0.5 mg under the skin into the appropriate area as directed 1 (One) Time Per Week.       No current facility-administered medications on file  "prior to visit.      Allergies as of 07/01/2020 - Reviewed 07/01/2020   Allergen Reaction Noted   • Penicillins  09/15/2016   • Venlafaxine  09/15/2016     Social History     Socioeconomic History   • Marital status:      Spouse name: Not on file   • Number of children: Not on file   • Years of education: Not on file   • Highest education level: Not on file   Tobacco Use   • Smoking status: Current Every Day Smoker     Packs/day: 1.00   • Smokeless tobacco: Never Used   Substance and Sexual Activity   • Alcohol use: No   • Drug use: Not Currently   • Sexual activity: Defer     History reviewed. No pertinent family history.    Review of Systems   Constitution: Positive for malaise/fatigue.   Cardiovascular: Positive for dyspnea on exertion and palpitations.   Respiratory: Positive for shortness of breath.    Musculoskeletal: Positive for joint pain.   All other systems reviewed and are negative.     Objective:     Vitals:    07/01/20 1047   BP: 140/78   Pulse: 67   SpO2: 98%   Weight: 86.2 kg (190 lb)   Height: 167.6 cm (66\")     Body mass index is 30.67 kg/m².    Physical Exam   Constitutional: She is oriented to person, place, and time. She appears well-developed and well-nourished.   HENT:   Head: Normocephalic and atraumatic.   Eyes: Conjunctivae are normal.   Neck: Neck supple.   Cardiovascular: Normal rate and regular rhythm. Exam reveals no gallop and no friction rub.   No murmur heard.  Pulmonary/Chest: Effort normal. She has decreased breath sounds.   Abdominal: Soft. There is no tenderness.   Musculoskeletal: She exhibits no edema.   Neurological: She is alert and oriented to person, place, and time.   Skin: Skin is warm.   Psychiatric: She has a normal mood and affect. Her behavior is normal.     Lab Review:   Procedures    Cardiac Procedures:  1.  Left heart catheterization on 11/12/2019 by Dr. Solares: The left main was normal.  The LAD was large and had luminal irregularities throughout.  The " left circumflex had a 90% mid lesion.  The high and mid marginals were occluded at the ostium.  The RCA was 100% occluded at the ostium.  The SVG to RV marginal branch was widely patent.  The SVG to mid marginal branch had a distal 70% stenosis.  2 other bypass grafts were chronically occluded.  The patient underwent placement of a 2.75 x 38 mm Xience JORGE to the mid left circumflex at that time.    Assessment:       Diagnosis Plan   1. Coronary artery disease of bypass graft of native heart with stable angina pectoris (CMS/[facility])     2. Essential hypertension     3. PVD (peripheral vascular disease) (CMS/[facility])       Plan:     The patient seems to be stable from a coronary artery disease standpoint.  She has not had any anginal symptoms recently.  I am going to keep her on the Plavix indefinitely, along with the aspirin.  She appears to be euvolemic on the Lasix at 40 mg/day.  She will continue on the Crestor at 40 mg/day.  She still has mild claudication in her left calf area, which is chronic.  This has not worsened recently.  Her blood pressure was recently elevated, and her metoprolol was increased to 100 mg twice a day.  She will continue on this along with the losartan at 100 mg/day.  She tells me that her blood pressure has been better than it was in the office today.  We again discussed smoking cessation in detail, and I strongly encourage this.  She is still trying to cut back, although she is having a very difficult time quitting.  I again explained to her how important this is.  I will see her back in the office in 6 months unless other issues arise.

## 2021-03-24 ENCOUNTER — OFFICE VISIT (OUTPATIENT)
Dept: CARDIOLOGY | Facility: CLINIC | Age: 61
End: 2021-03-24

## 2021-03-24 VITALS
BODY MASS INDEX: 28.93 KG/M2 | OXYGEN SATURATION: 98 % | HEART RATE: 69 BPM | SYSTOLIC BLOOD PRESSURE: 170 MMHG | WEIGHT: 180 LBS | DIASTOLIC BLOOD PRESSURE: 88 MMHG | HEIGHT: 66 IN

## 2021-03-24 DIAGNOSIS — I10 ESSENTIAL HYPERTENSION: ICD-10-CM

## 2021-03-24 DIAGNOSIS — I73.9 ASYMPTOMATIC PVD (PERIPHERAL VASCULAR DISEASE) (HCC): ICD-10-CM

## 2021-03-24 DIAGNOSIS — I25.810 CORONARY ARTERY DISEASE INVOLVING CORONARY BYPASS GRAFT OF NATIVE HEART WITHOUT ANGINA PECTORIS: Primary | ICD-10-CM

## 2021-03-24 PROCEDURE — 99213 OFFICE O/P EST LOW 20 MIN: CPT | Performed by: INTERNAL MEDICINE

## 2021-03-24 RX ORDER — NITROGLYCERIN 0.4 MG/1
0.4 TABLET SUBLINGUAL AS NEEDED
Qty: 25 TABLET | Refills: 6 | Status: SHIPPED | OUTPATIENT
Start: 2021-03-24

## 2021-03-24 RX ORDER — MULTIPLE VITAMINS W/ MINERALS TAB 9MG-400MCG
1 TAB ORAL DAILY
Status: ON HOLD | COMMUNITY
End: 2022-02-16

## 2021-03-24 NOTE — PROGRESS NOTES
Date of Office Visit:  3/24/2021  Encounter Provider: Saurabh Camp MD  Place of Service: [facility] CARDIOLOGY  Patient Name: Vandana Glass  :1960    Chief complaint: Follow-up for coronary artery disease, hypertension, diabetes, peripheral vascular disease.    History of Present Illness:    I had the pleasure of seeing the patient in cardiology office on 3/24/2021.  She is a very pleasant 60 year-old white female with a history of coronary artery disease, peripheral vascular disease, hypertension, diabetes who presents for follow-up.  The patient has formerly followed with Dr. Parra in our group.    The patient has a history of remote coronary artery bypass grafting.  I do not have the initial details or operative report, although 2 out of 4 of her bypass grafts were occluded by heart catheterization in 2018.  She also has a history of peripheral vascular disease, and underwent remote aortobifemoral bypass.  She was admitted to [facility] in 2019 with recurrent chest and arm pain, which has been her anginal equivalent in the past.  She had an abnormal Lexiscan Cardiolite stress test which showed moderate ischemia in the lateral wall and apex.  She subsequently underwent a left heart catheterization on 2018 by Dr. Solares.  This showed a 90% lesion in the mid left circumflex, occluded high and mid marginals at the ostium, and a 100% occlusion of the RCA at the ostium.  The LAD had only luminal irregularities.  The SVG to RV marginal was widely patent, although the SVG to mid marginal had a distal 70% stenosis.  The patient subsequently underwent placement of a 2.75 x 38 mm Xience JORGE to the mid left circumflex coronary artery at that time.     The patient presents today for follow-up.  She still has some chronic claudication symptoms in her left calf, although these have not changed significantly.  Unfortunately, she has had a significant amount of  personal stress, and she is still smoking.  She has not had any chest pain.  Her shortness of breath is at baseline.  Her blood pressure is elevated today at 170/88, although she tells me she did not take her morning medications yet, and she was nervous this morning coming in to the office.      Past Medical History:   Diagnosis Date   • Coronary artery disease    • Hyperlipidemia    • Hypertension    • Peripheral arterial disease (CMS/[facility])     Status post aortobifemoral bypass   • Type 2 diabetes mellitus (CMS/[facility])        Past Surgical History:   Procedure Laterality Date   • APPENDECTOMY     • CARDIAC CATHETERIZATION     • CARDIAC CATHETERIZATION N/A 12/28/2018    Procedure: Left ventriculography;  Surgeon: Fan Parra MD;  [facility];  Service: Cardiovascular   • CARDIAC CATHETERIZATION N/A 12/28/2018    Procedure: Coronary angiography;  Surgeon: Fan Parra MD;  Location:  [facility] CATH INVASIVE LOCATION;  Service: Cardiovascular   • CARDIAC CATHETERIZATION  12/28/2018    Procedure: Saphenous Vein Graft;  Surgeon: Fan Parra MD;  [facility];  Service: Cardiovascular   • CARDIAC CATHETERIZATION N/A 12/28/2018    Procedure: Left Heart Cath;  Surgeon: Fan Parra MD;  [facility];  Service: Cardiovascular   • CARDIAC CATHETERIZATION N/A 11/12/2019    Procedure: Coronary angiography;  Surgeon: Blake Solares MD;  [facility];  Service: Cardiovascular   • CARDIAC CATHETERIZATION N/A 11/12/2019    Procedure: Stent JORGE bypass graft;  Surgeon: Blake Solares MD;  [facility];  Service: Cardiovascular   • CARDIAC CATHETERIZATION N/A 11/12/2019    Procedure: Stent JORGE coronary;  Surgeon: Blake Solares MD;  [facility];  Service: Cardiovascular   • CARDIAC CATHETERIZATION N/A 11/12/2019    Procedure: Saphenous Vein Graft;  Surgeon: Sujatha  Blake HACKETT MD;  Location: [facility] CATH INVASIVE LOCATION;  Service: Cardiovascular   • CARDIAC SURGERY     • CORONARY ARTERY BYPASS GRAFT     • HYSTERECTOMY     • IA RT/LT HEART CATHETERS N/A 11/12/2019    Procedure: Percutaneous Coronary Intervention;  Surgeon: Blake Solares MD;  Location: [facility] CATH INVASIVE LOCATION;  Service: Cardiovascular   • TUBAL ABDOMINAL LIGATION     • VASCULAR SURGERY         Current Outpatient Medications on File Prior to Visit   Medication Sig Dispense Refill   • aspirin 81 MG tablet Take 1 tablet by mouth daily.     • citalopram (CeleXA) 40 MG tablet Take 1 tablet by mouth daily.     • clopidogrel (PLAVIX) 75 MG tablet Take 1 tablet by mouth daily.     • furosemide (LASIX) 40 MG tablet Daily.     • Insulin Degludec (TRESIBA FLEXTOUCH SC) Inject 50 Units under the skin into the appropriate area as directed 2 (Two) Times a Day.     • Insulin Degludec (TRESIBA FLEXTOUCH) 200 UNIT/ML solution pen-injector pen injection      • insulin lispro (HUMALOG) 100 UNIT/ML injection Inject 20 Units under the skin into the appropriate area as directed 3 (Three) Times a Day Before Meals.     • iron polysaccharides (Ferrex 150) 150 MG capsule Daily.     • losartan (COZAAR) 100 MG tablet Take 1 tablet by mouth Daily. 90 tablet 3   • metFORMIN (GLUCOPHAGE) 1000 MG tablet Take 1 tablet by mouth 2 (two) times a day.     • metoprolol tartrate (LOPRESSOR) 100 MG tablet Take 100 mg by mouth 2 (Two) Times a Day.     • multivitamin with minerals (MULTIVITAMIN ADULT PO) Take 1 tablet by mouth Daily.     • OZEMPIC, 1 MG/DOSE, 2 MG/1.5ML solution pen-injector      • pantoprazole (PROTONIX) 40 MG EC tablet Take 1 tablet by mouth daily.     • rosuvastatin (CRESTOR) 40 MG tablet      • Semaglutide (OZEMPIC SC) Inject 0.5 mg under the skin into the appropriate area as directed 1 (One) Time Per Week.     • [DISCONTINUED] nitroglycerin (NITROSTAT) 0.4 MG SL tablet Place 1 tablet under the tongue as needed.     •  "[DISCONTINUED] metFORMIN (Glucophage) 1000 MG tablet Every 12 (Twelve) Hours.       No current facility-administered medications on file prior to visit.     Allergies as of 03/24/2021 - Reviewed 03/24/2021   Allergen Reaction Noted   • Penicillins  09/15/2016   • Venlafaxine  09/15/2016     Social History     Socioeconomic History   • Marital status:      Spouse name: Not on file   • Number of children: Not on file   • Years of education: Not on file   • Highest education level: Not on file   Tobacco Use   • Smoking status: Current Every Day Smoker     Packs/day: 1.00   • Smokeless tobacco: Never Used   Substance and Sexual Activity   • Alcohol use: No   • Drug use: Not Currently   • Sexual activity: Defer     History reviewed. No pertinent family history.    Review of Systems   Constitutional: Positive for malaise/fatigue.   Cardiovascular: Positive for dyspnea on exertion and palpitations.   Respiratory: Positive for shortness of breath.    Musculoskeletal: Positive for joint pain.   All other systems reviewed and are negative.     Objective:     Vitals:    03/24/21 0941   BP: 170/88   Pulse: 69   SpO2: 98%   Weight: 81.6 kg (180 lb)   Height: 167.6 cm (66\")     Body mass index is 29.05 kg/m².    Physical Exam  Constitutional:       Appearance: She is well-developed.   HENT:      Head: Normocephalic and atraumatic.   Eyes:      Conjunctiva/sclera: Conjunctivae normal.   Cardiovascular:      Rate and Rhythm: Normal rate and regular rhythm.      Heart sounds: No murmur heard.   No friction rub. No gallop.    Pulmonary:      Effort: Pulmonary effort is normal.      Breath sounds: Decreased breath sounds present.   Abdominal:      Palpations: Abdomen is soft.      Tenderness: There is no abdominal tenderness.   Musculoskeletal:      Cervical back: Neck supple.   Skin:     General: Skin is warm.   Neurological:      Mental Status: She is alert and oriented to person, place, and time.   Psychiatric:         " Behavior: Behavior normal.       Lab Review:   Procedures    Cardiac Procedures:  1.  Left heart catheterization on 11/12/2019 by Dr. Solares: The left main was normal.  The LAD was large and had luminal irregularities throughout.  The left circumflex had a 90% mid lesion.  The high and mid marginals were occluded at the ostium.  The RCA was 100% occluded at the ostium.  The SVG to RV marginal branch was widely patent.  The SVG to mid marginal branch had a distal 70% stenosis.  2 other bypass grafts were chronically occluded.  The patient underwent placement of a 2.75 x 38 mm Xience JORGE to the mid left circumflex at that time.    Assessment:       Diagnosis Plan   1. Coronary artery disease involving coronary bypass graft of native heart without angina pectoris     2. Essential hypertension     3. Asymptomatic PVD (peripheral vascular disease) (CMS/[facility])       Plan:     The patient seems to be stable from a coronary artery disease standpoint.  She has not had any anginal symptoms recently.  I am going to keep her on the Plavix indefinitely, along with the aspirin.  I really feel that dual antiplatelet therapy is going to be essential for her.  She appears to be euvolemic on the Lasix at 40 mg/day.  She will continue on the Crestor at 40 mg/day.  She still has mild claudication in her left calf area, which is chronic.  This has not worsened recently.  She is currently on losartan at 100 mg/day and metoprolol 100 mg twice a day.  She has not taken her blood pressure medication this morning.  She also told me that she was nervous, and she feels like her blood pressure is elevated because of this.  I told her that I need her to watch this at home, and if she has any readings that are consistently above 130/80, she needs to let me know.  She told me she would do this.  I did renew her nitroglycerin.  She has had a very difficult time with quitting smoking, and I again emphasized the importance of this.  I am going to  have her follow-up with me in 6 months.

## 2021-08-31 PROBLEM — N18.30 STAGE 3 CHRONIC KIDNEY DISEASE (HCC): Status: ACTIVE | Noted: 2021-08-31

## 2021-08-31 RX ORDER — CHOLECALCIFEROL (VITAMIN D3) 100000/G
POWDER (GRAM) MISCELLANEOUS EVERY 24 HOURS
Status: ON HOLD | COMMUNITY
End: 2022-02-16

## 2022-02-09 ENCOUNTER — TELEPHONE (OUTPATIENT)
Dept: CARDIOLOGY | Facility: CLINIC | Age: 62
End: 2022-02-09

## 2022-02-09 ENCOUNTER — OFFICE VISIT (OUTPATIENT)
Dept: CARDIOLOGY | Facility: CLINIC | Age: 62
End: 2022-02-09

## 2022-02-09 VITALS — BODY MASS INDEX: 29.05 KG/M2 | HEIGHT: 66 IN

## 2022-02-09 DIAGNOSIS — E11.59 TYPE 2 DIABETES MELLITUS WITH OTHER CIRCULATORY COMPLICATION, WITH LONG-TERM CURRENT USE OF INSULIN: ICD-10-CM

## 2022-02-09 DIAGNOSIS — I25.700 CORONARY ARTERY DISEASE INVOLVING CORONARY BYPASS GRAFT OF NATIVE HEART WITH UNSTABLE ANGINA PECTORIS: Primary | ICD-10-CM

## 2022-02-09 DIAGNOSIS — I10 PRIMARY HYPERTENSION: ICD-10-CM

## 2022-02-09 DIAGNOSIS — I73.9 PVD (PERIPHERAL VASCULAR DISEASE): ICD-10-CM

## 2022-02-09 DIAGNOSIS — Z79.4 TYPE 2 DIABETES MELLITUS WITH OTHER CIRCULATORY COMPLICATION, WITH LONG-TERM CURRENT USE OF INSULIN: ICD-10-CM

## 2022-02-09 PROCEDURE — 93000 ELECTROCARDIOGRAM COMPLETE: CPT | Performed by: INTERNAL MEDICINE

## 2022-02-09 PROCEDURE — 99214 OFFICE O/P EST MOD 30 MIN: CPT | Performed by: INTERNAL MEDICINE

## 2022-02-09 NOTE — TELEPHONE ENCOUNTER
02/09/22   Pt was in office today in [place].   Dr Camp ordered Cath for pt to be done at first of next week.   Pt's new cell number to call is [phone]   Thanks Nas GARCIA

## 2022-02-10 ENCOUNTER — TRANSCRIBE ORDERS (OUTPATIENT)
Dept: CARDIOLOGY | Facility: CLINIC | Age: 62
End: 2022-02-10

## 2022-02-10 DIAGNOSIS — Z01.818 OTHER SPECIFIED PRE-OPERATIVE EXAMINATION: Primary | ICD-10-CM

## 2022-02-10 DIAGNOSIS — Z13.6 SCREENING FOR ISCHEMIC HEART DISEASE: ICD-10-CM

## 2022-02-10 DIAGNOSIS — Z01.810 PRE-OPERATIVE CARDIOVASCULAR EXAMINATION: ICD-10-CM

## 2022-02-16 ENCOUNTER — HOSPITAL ENCOUNTER (OUTPATIENT)
Facility: HOSPITAL | Age: 62
Setting detail: HOSPITAL OUTPATIENT SURGERY
Discharge: HOME | End: 2022-02-16
Attending: INTERNAL MEDICINE | Admitting: INTERNAL MEDICINE

## 2022-02-16 VITALS
SYSTOLIC BLOOD PRESSURE: 167 MMHG | RESPIRATION RATE: 16 BRPM | HEART RATE: 71 BPM | DIASTOLIC BLOOD PRESSURE: 81 MMHG | BODY MASS INDEX: 28.77 KG/M2 | HEIGHT: 66 IN | TEMPERATURE: 97.8 F | OXYGEN SATURATION: 95 % | WEIGHT: 179 LBS

## 2022-02-16 DIAGNOSIS — I25.700 CORONARY ARTERY DISEASE INVOLVING CORONARY BYPASS GRAFT OF NATIVE HEART WITH UNSTABLE ANGINA PECTORIS: ICD-10-CM

## 2022-02-16 LAB
ACT BLD: 237 SECONDS (ref 82–152)
ACT BLD: 273 SECONDS (ref 82–152)
ACT BLD: 291 SECONDS (ref 82–152)
GLUCOSE BLDC GLUCOMTR-MCNC: 101 MG/DL (ref 70–130)
GLUCOSE BLDC GLUCOMTR-MCNC: 98 MG/DL (ref 70–130)
QT INTERVAL: 428 MS
SARS-COV-2 RNA PNL SPEC NAA+PROBE: NOT DETECTED

## 2022-02-16 PROCEDURE — C1894 INTRO/SHEATH, NON-LASER: HCPCS | Performed by: INTERNAL MEDICINE

## 2022-02-16 PROCEDURE — 93459 L HRT ART/GRFT ANGIO: CPT | Performed by: INTERNAL MEDICINE

## 2022-02-16 PROCEDURE — C1887 CATHETER, GUIDING: HCPCS | Performed by: INTERNAL MEDICINE

## 2022-02-16 PROCEDURE — C9600 PERC DRUG-EL COR STENT SING: HCPCS | Performed by: INTERNAL MEDICINE

## 2022-02-16 PROCEDURE — C1769 GUIDE WIRE: HCPCS | Performed by: INTERNAL MEDICINE

## 2022-02-16 PROCEDURE — 25010000002 HEPARIN (PORCINE) PER 1000 UNITS: Performed by: INTERNAL MEDICINE

## 2022-02-16 PROCEDURE — 87635 SARS-COV-2 COVID-19 AMP PRB: CPT | Performed by: INTERNAL MEDICINE

## 2022-02-16 PROCEDURE — C9803 HOPD COVID-19 SPEC COLLECT: HCPCS

## 2022-02-16 PROCEDURE — 82962 GLUCOSE BLOOD TEST: CPT

## 2022-02-16 PROCEDURE — 25010000002 MIDAZOLAM PER 1 MG: Performed by: INTERNAL MEDICINE

## 2022-02-16 PROCEDURE — 99153 MOD SED SAME PHYS/QHP EA: CPT | Performed by: INTERNAL MEDICINE

## 2022-02-16 PROCEDURE — 93005 ELECTROCARDIOGRAM TRACING: CPT | Performed by: INTERNAL MEDICINE

## 2022-02-16 PROCEDURE — 0 IOPAMIDOL PER 1 ML: Performed by: INTERNAL MEDICINE

## 2022-02-16 PROCEDURE — 92928 PRQ TCAT PLMT NTRAC ST 1 LES: CPT | Performed by: INTERNAL MEDICINE

## 2022-02-16 PROCEDURE — C1874 STENT, COATED/COV W/DEL SYS: HCPCS | Performed by: INTERNAL MEDICINE

## 2022-02-16 PROCEDURE — 25010000002 HYDRALAZINE PER 20 MG: Performed by: INTERNAL MEDICINE

## 2022-02-16 PROCEDURE — 85347 COAGULATION TIME ACTIVATED: CPT

## 2022-02-16 PROCEDURE — C1725 CATH, TRANSLUMIN NON-LASER: HCPCS | Performed by: INTERNAL MEDICINE

## 2022-02-16 PROCEDURE — 99152 MOD SED SAME PHYS/QHP 5/>YRS: CPT | Performed by: INTERNAL MEDICINE

## 2022-02-16 PROCEDURE — 25010000002 FENTANYL CITRATE (PF) 50 MCG/ML SOLUTION: Performed by: INTERNAL MEDICINE

## 2022-02-16 DEVICE — XIENCE SKYPOINT™ EVEROLIMUS ELUTING CORONARY STENT SYSTEM 3.00 MM X 23 MM / RAPID-EXCHANGE
Type: IMPLANTABLE DEVICE | Site: CORONARY | Status: FUNCTIONAL
Brand: XIENCE SKYPOINT™

## 2022-02-16 RX ORDER — SODIUM CHLORIDE 0.9 % (FLUSH) 0.9 %
3 SYRINGE (ML) INJECTION EVERY 12 HOURS SCHEDULED
Status: DISCONTINUED | OUTPATIENT
Start: 2022-02-16 | End: 2022-02-16 | Stop reason: HOSPADM

## 2022-02-16 RX ORDER — SODIUM CHLORIDE 9 MG/ML
75 INJECTION, SOLUTION INTRAVENOUS CONTINUOUS
Status: DISCONTINUED | OUTPATIENT
Start: 2022-02-16 | End: 2022-02-16 | Stop reason: HOSPADM

## 2022-02-16 RX ORDER — HYDRALAZINE HYDROCHLORIDE 20 MG/ML
INJECTION INTRAMUSCULAR; INTRAVENOUS AS NEEDED
Status: DISCONTINUED | OUTPATIENT
Start: 2022-02-16 | End: 2022-02-16 | Stop reason: HOSPADM

## 2022-02-16 RX ORDER — SODIUM CHLORIDE 0.9 % (FLUSH) 0.9 %
10 SYRINGE (ML) INJECTION AS NEEDED
Status: DISCONTINUED | OUTPATIENT
Start: 2022-02-16 | End: 2022-02-16 | Stop reason: HOSPADM

## 2022-02-16 RX ORDER — ACETAMINOPHEN 325 MG/1
650 TABLET ORAL EVERY 4 HOURS PRN
Status: DISCONTINUED | OUTPATIENT
Start: 2022-02-16 | End: 2022-02-16 | Stop reason: HOSPADM

## 2022-02-16 RX ORDER — SODIUM CHLORIDE 9 MG/ML
1 INJECTION, SOLUTION INTRAVENOUS CONTINUOUS
Status: ACTIVE | OUTPATIENT
Start: 2022-02-16 | End: 2022-02-16

## 2022-02-16 RX ORDER — ASPIRIN 81 MG/1
TABLET, CHEWABLE ORAL AS NEEDED
Status: DISCONTINUED | OUTPATIENT
Start: 2022-02-16 | End: 2022-02-16 | Stop reason: HOSPADM

## 2022-02-16 RX ORDER — HEPARIN SODIUM 1000 [USP'U]/ML
INJECTION, SOLUTION INTRAVENOUS; SUBCUTANEOUS AS NEEDED
Status: DISCONTINUED | OUTPATIENT
Start: 2022-02-16 | End: 2022-02-16 | Stop reason: HOSPADM

## 2022-02-16 RX ORDER — CLOPIDOGREL BISULFATE 75 MG/1
TABLET ORAL AS NEEDED
Status: DISCONTINUED | OUTPATIENT
Start: 2022-02-16 | End: 2022-02-16 | Stop reason: HOSPADM

## 2022-02-16 RX ORDER — MIDAZOLAM HYDROCHLORIDE 1 MG/ML
INJECTION INTRAMUSCULAR; INTRAVENOUS AS NEEDED
Status: DISCONTINUED | OUTPATIENT
Start: 2022-02-16 | End: 2022-02-16 | Stop reason: HOSPADM

## 2022-02-16 RX ORDER — LOSARTAN POTASSIUM 100 MG/1
100 TABLET ORAL ONCE
Status: COMPLETED | OUTPATIENT
Start: 2022-02-16 | End: 2022-02-16

## 2022-02-16 RX ORDER — FENTANYL CITRATE 50 UG/ML
INJECTION, SOLUTION INTRAMUSCULAR; INTRAVENOUS AS NEEDED
Status: DISCONTINUED | OUTPATIENT
Start: 2022-02-16 | End: 2022-02-16 | Stop reason: HOSPADM

## 2022-02-16 RX ORDER — LIDOCAINE HYDROCHLORIDE 20 MG/ML
INJECTION, SOLUTION INFILTRATION; PERINEURAL AS NEEDED
Status: DISCONTINUED | OUTPATIENT
Start: 2022-02-16 | End: 2022-02-16 | Stop reason: HOSPADM

## 2022-02-16 RX ADMIN — SODIUM CHLORIDE 75 ML/HR: 9 INJECTION, SOLUTION INTRAVENOUS at 08:28

## 2022-02-16 RX ADMIN — LOSARTAN POTASSIUM 100 MG: 100 TABLET, FILM COATED ORAL at 16:59

## 2022-02-16 RX ADMIN — METOPROLOL TARTRATE 100 MG: 50 TABLET, FILM COATED ORAL at 16:59

## 2022-02-16 RX ADMIN — ACETAMINOPHEN 650 MG: 325 TABLET, FILM COATED ORAL at 12:44

## 2022-02-16 NOTE — PROGRESS NOTES
Date of Office Visit:  2022  Encounter Provider: Saurabh Camp MD  Place of Service: [facility] CARDIOLOGY  Patient Name: Vandana Glass  :1960    Chief complaint: Follow-up for coronary artery disease, hypertension, diabetes, peripheral vascular disease.    History of Present Illness:    I had the pleasure of seeing the patient in cardiology office on 2022.  She is a very pleasant 61 year-old white female with a history of coronary artery disease, peripheral vascular disease, hypertension, diabetes who presents for follow-up.  The patient has formerly followed with Dr. Parra in our group.    The patient has a history of remote coronary artery bypass grafting.  I do not have the initial details or operative report, although 2 out of 4 of her bypass grafts were occluded by heart catheterization in 2018.  She also has a history of peripheral vascular disease, and underwent remote aortobifemoral bypass.  She was admitted to [facility] in 2019 with recurrent chest and arm pain, which has been her anginal equivalent in the past.  She had an abnormal Lexiscan Cardiolite stress test which showed moderate ischemia in the lateral wall and apex.  She subsequently underwent a left heart catheterization on 2018 by Dr. Solares.  This showed a 90% lesion in the mid left circumflex, occluded high and mid marginals at the ostium, and a 100% occlusion of the RCA at the ostium.  The LAD had only luminal irregularities.  The SVG to RV marginal was widely patent, although the SVG to mid marginal had a distal 70% stenosis.  The patient subsequently underwent placement of a 2.75 x 38 mm Xience JORGE to the mid left circumflex coronary artery at that time.     The patient presents today for follow-up.  For the last 6 to 8 weeks, the patient has had pressure in the center of her chest which radiates up into her neck.  She feels this very frequently, and it is worse with  exertion.  She states that walking to her mailbox and back will typically cause her to have to stop and rest.  The symptoms have been becoming more frequent in the last several days as well.  She is still having some chronic claudication symptoms in her left calf, although these are unchanged.  Unfortunately, she is still smoking.      Past Medical History:   Diagnosis Date   • Coronary artery disease    • Hyperlipidemia    • Hypertension    • Peripheral arterial disease (HCC)     Status post aortobifemoral bypass   • Type 2 diabetes mellitus (HCC)        Past Surgical History:   Procedure Laterality Date   • APPENDECTOMY     • CARDIAC CATHETERIZATION     • CARDIAC CATHETERIZATION N/A 12/28/2018    Procedure: Left ventriculography;  Surgeon: Fan Parra MD;  Location: [facility] CATH INVASIVE LOCATION;  Service: Cardiovascular   • CARDIAC CATHETERIZATION N/A 12/28/2018    Procedure: Coronary angiography;  Surgeon: Fan Parra MD;  Location: [facility] CATH INVASIVE LOCATION;  Service: Cardiovascular   • CARDIAC CATHETERIZATION  12/28/2018    Procedure: Saphenous Vein Graft;  Surgeon: Fan Parra MD;  Location: [facility] CATH INVASIVE LOCATION;  Service: Cardiovascular   • CARDIAC CATHETERIZATION N/A 12/28/2018    Procedure: Left Heart Cath;  Surgeon: Fan Parra MD;  Location: [facility] CATH INVASIVE LOCATION;  Service: Cardiovascular   • CARDIAC CATHETERIZATION N/A 11/12/2019    Procedure: Coronary angiography;  Surgeon: Blake Solares MD;  Location: [facility] CATH INVASIVE LOCATION;  Service: Cardiovascular   • CARDIAC CATHETERIZATION N/A 11/12/2019    Procedure: Stent JORGE bypass graft;  Surgeon: Blake Solares MD;  Location: [facility] CATH INVASIVE LOCATION;  Service: Cardiovascular   • CARDIAC CATHETERIZATION N/A 11/12/2019    Procedure: Stent JORGE coronary;  Surgeon: Blake Solares MD;  Location: [facility] CATH INVASIVE LOCATION;  Service: Cardiovascular   • CARDIAC CATHETERIZATION N/A 11/12/2019    Procedure: Saphenous  Vein Graft;  Surgeon: Blake Solares MD;  Location:  [facility] CATH INVASIVE LOCATION;  Service: Cardiovascular   • CARDIAC SURGERY     • CORONARY ARTERY BYPASS GRAFT     • HYSTERECTOMY     • [place] RT/LT HEART CATHETERS N/A 11/12/2019    Procedure: Percutaneous Coronary Intervention;  Surgeon: Blake Solares MD;  Location:  [facility] CATH INVASIVE LOCATION;  Service: Cardiovascular   • TUBAL ABDOMINAL LIGATION     • VASCULAR SURGERY         Current Outpatient Medications on File Prior to Visit   Medication Sig Dispense Refill   • aspirin 81 MG tablet Take 1 tablet by mouth daily.     • citalopram (CeleXA) 40 MG tablet Take 20 mg by mouth Daily.     • clopidogrel (PLAVIX) 75 MG tablet Take 1 tablet by mouth daily.     • furosemide (LASIX) 40 MG tablet Daily.     • Insulin Degludec (TRESIBA FLEXTOUCH SC) Inject 50 Units under the skin into the appropriate area as directed 2 (Two) Times a Day.     • insulin lispro (HUMALOG) 100 UNIT/ML injection Inject 20 Units under the skin into the appropriate area as directed 3 (Three) Times a Day Before Meals.     • losartan (COZAAR) 100 MG tablet Take 1 tablet by mouth Daily. 90 tablet 3   • metFORMIN (GLUCOPHAGE) 1000 MG tablet Take 1 tablet by mouth 2 (two) times a day.     • metoprolol tartrate (LOPRESSOR) 100 MG tablet Take 100 mg by mouth 2 (Two) Times a Day.     • nitroglycerin (Nitrostat) 0.4 MG SL tablet Place 1 tablet under the tongue As Needed (TAKE 1 TABLET FOR CHEST PAIN EVERY 5 MINUTES.  MAX DOSE 3 TABLETS). 25 tablet 6   • OZEMPIC, 1 MG/DOSE, 2 MG/1.5ML solution pen-injector      • pantoprazole (PROTONIX) 40 MG EC tablet Take 1 tablet by mouth daily.     • rosuvastatin (CRESTOR) 40 MG tablet      • Cholecalciferol (Vitamin D3) 281553 UNIT/GM powder Daily.     • Insulin Degludec (TRESIBA FLEXTOUCH) 200 UNIT/ML solution pen-injector pen injection      • iron polysaccharides (Ferrex 150) 150 MG capsule Daily.     • multivitamin with minerals (MULTIVITAMIN ADULT PO) Take 1  "tablet by mouth Daily.     • Semaglutide (OZEMPIC SC) Inject 0.5 mg under the skin into the appropriate area as directed 1 (One) Time Per Week.       No current facility-administered medications on file prior to visit.     Allergies as of 02/09/2022 - Reviewed 03/24/2021   Allergen Reaction Noted   • Penicillins  09/15/2016   • Venlafaxine  09/15/2016     Social History     Socioeconomic History   • Marital status:    Tobacco Use   • Smoking status: Current Every Day Smoker     Packs/day: 1.00   • Smokeless tobacco: Never Used   Substance and Sexual Activity   • Alcohol use: No   • Drug use: Not Currently   • Sexual activity: Defer     History reviewed. No pertinent family history.    Review of Systems   Constitutional: Positive for malaise/fatigue.   Cardiovascular: Positive for chest pain, dyspnea on exertion and palpitations.   Respiratory: Positive for shortness of breath.    Musculoskeletal: Positive for joint pain.   All other systems reviewed and are negative.     Objective:     Vitals:    02/09/22 1428   Height: 167.6 cm (66\")     Body mass index is 29.05 kg/m².    Physical Exam  Constitutional:       Appearance: She is well-developed.   HENT:      Head: Normocephalic and atraumatic.   Eyes:      Conjunctiva/sclera: Conjunctivae normal.   Cardiovascular:      Rate and Rhythm: Normal rate and regular rhythm.      Heart sounds: No murmur heard.  No friction rub. No gallop.    Pulmonary:      Effort: Pulmonary effort is normal.      Breath sounds: Decreased breath sounds present.   Abdominal:      Palpations: Abdomen is soft.      Tenderness: There is no abdominal tenderness.   Musculoskeletal:      Cervical back: Neck supple.   Skin:     General: Skin is warm.   Neurological:      Mental Status: She is alert and oriented to person, place, and time.   Psychiatric:         Behavior: Behavior normal.       Lab Review:     ECG 12 Lead    Date/Time: 2/9/2022 3:35 PM  Performed by: Saurabh Camp, " MD  Authorized by: Saurabh Camp MD   Comparison: compared with previous ECG from 11/13/2019  Similar to previous ECG  Rhythm: sinus rhythm  Ectopy: infrequent PVCs  Rate: normal  BPM: 69  Other findings: left ventricular hypertrophy and left atrial abnormality    Clinical impression: abnormal EKG  Comments: ST and T wave changes, consider inferolateral ischemia            Cardiac Procedures:  1.  Left heart catheterization on 11/12/2019 by Dr. Solares: The left main was normal.  The LAD was large and had luminal irregularities throughout.  The left circumflex had a 90% mid lesion.  The high and mid marginals were occluded at the ostium.  The RCA was 100% occluded at the ostium.  The SVG to RV marginal branch was widely patent.  The SVG to mid marginal branch had a distal 70% stenosis.  2 other bypass grafts were chronically occluded.  The patient underwent placement of a 2.75 x 38 mm Xience JORGE to the mid left circumflex at that time.    Assessment:       Diagnosis Plan   1. Coronary artery disease involving coronary bypass graft of native heart with unstable angina pectoris ([facility])  Case Request Cath Lab: Coronary angiography, Left heart cath, Left ventriculography   2. Primary hypertension     3. Type 2 diabetes mellitus with other circulatory complication, with long-term current use of insulin ([facility])     4. PVD (peripheral vascular disease) ([facility])       Plan:     The patient's symptoms seem to be consistent with accelerated angina.  She has been getting chest pressure going into her throat for the last 6 to 8 weeks, and the symptoms are getting more frequent.  These are worse with exertion, and improved with rest.  She has an extensive history of coronary artery disease, and is continued to smoke.  I have recommended a diagnostic left heart catheterization and bypass graft anatomy at this point.  I discussed this in detail with the patient, and she is in agreement with the plan.  She will need to hold her  Metformin 2 days prior to the heart catheterization.    I did tell her to go to the ER for any severe chest discomfort.  She will continue on aspirin, Plavix, metoprolol, losartan, and Crestor.  She does have sublingual nitroglycerin if needed.  She appears to be euvolemic on the Lasix at 40 mg/day.  She has had a very difficult time with quitting smoking, and I again emphasized the importance of this.  Further plans and follow-up will be determined after the heart catheterization.

## 2022-02-16 NOTE — PERIOPERATIVE NURSING NOTE
Called and spoke with Dr. Kelly in regards to BP trending up 184/79 and 175/70. Stated to give her home dose of metoprolol tartrate 100mg and Cozaar 100mg, then patient can be discharged home.

## 2022-02-16 NOTE — DISCHARGE INSTRUCTIONS
[facility]  [address] [place] [address]    Coronary Angioplasty with or without  Stent (Radial Approach) After Care    Refer to this sheet in the next few weeks. These instructions provide you with information on caring for yourself after your procedure. Your health care provider may also give you more specific instructions. Your treatment has been planned according to current medical practices, but problems sometimes occur. Call your health care provider if you have any problems or questions after your procedure.       Home Care Instructions:  · You may shower the day after the procedure. Remove the bandage (dressing) and gently wash the site with plain soap and water. Gently pat the site dry. You may apply a band aid daily for 2 days if desired.    · Do not apply powder or lotion to the site.  · Do not submerge the affected site in water for 3 to 5 days or until the site is completely healed.   · Do not flex or bend at the wrist with affected arm for 24 hours.  · Do not lift, push or pull anything over 5 pounds for 5 days after your procedure or as directed by your physician.  For a reference, a gallon of milk weighs 8 pounds.    · Do not operate machinery or power tools for 24 hours.  · Inspect the site at least twice daily. You may notice some bruising at the site and it may be tender for 1 to 2 weeks.     · Increase your fluid intake for the next 2 days.    · Keep arm elevated for 24 hours.  For the remainder of the day, keep your arm in the “Pledge of Allegiance” position when up and about.    · Limit your activity for the next 48 hours and avoid strenuous activity as directed by your physician.   · Cardiac Rehab may or may not be ordered.  Please consult with your physician  · You may drive 24 hours after the procedure unless otherwise instructed by your caregiver.  · A responsible adult should be with you for the first 24 hours after you arrive home.   · Do not make any important  legal decisions or sign legal papers for 24 hours. Do not drink alcohol for 24 hours.    · Take medicines only as directed by your health care provider.  Blood thinners may be prescribed after your procedure to improve blood flow through the stent.    · Metformin or any medications containing Metformin should not be taken for 48 hours after your procedure.    · Eat a heart-healthy diet. This should include plenty of fresh fruits and vegetables. Meat should be lean cuts. Avoid the following types of food:    ¨ Food that is high in salt.    ¨ Canned or highly processed food.    ¨ Food that is high in saturated fat or sugar.    ¨ Fried food.    · Make any other lifestyle changes recommended by your health care provider. This may include:    ¨ Not using any tobacco products including cigarettes, chewing tobacco, or electronic cigarettes.   ¨ Managing your weight.    ¨ Getting regular exercise.    ¨ Managing your blood pressure.    ¨ Limiting your alcohol intake.    ¨ Managing other health problems, such as diabetes.    · If you need an MRI after your heart stent was placed, be sure to tell the health care provider who orders the MRI that you have a heart stent.    · Keep all follow-up visits as directed by your health care provider.    ·   Call Your Doctor If:    · You have unusual pain at the radial/ulnar (wrist) site.  · You have redness, warmth, swelling, or pain at the radial/ulnar (wrist) site.  · You have drainage (other than a small amount of blood on the dressing).  · `You have chills or a fever > 101.  · Your arm becomes pale or dark, cool, tingly, or numb.  · You develop chest pain, shortness of breath, feel faint or pass out.    · You have heavy bleeding from the site.  If you do, hold pressure on the site for 20 minutes.  If the bleeding stops, apply a fresh bandage and call your cardiologist.  However, if you continue to have bleeding, maintain pressure and call 911.    · You have any symptoms of a stroke.   Remember BE FAST  · B-balance. Sudden trouble walking or loss of balance.  · E-eyes.  Sudden changes in how you see or a sudden onset of a very bad headache.   · F-face. Sudden weakness or loss of feeling of the face or facial droop on one side.   · A-arms Sudden weakness or numbness in one arm. One arm drifts down if they are both held out in front of you. This happens suddenly and usually on one side of the body.   · S-speech.  Sudden trouble speaking, slurred speech or trouble understanding what people are saying.   · T-time  Time to call emergency services.  Write down the symptoms and the time they started.

## 2022-02-16 NOTE — CONSULTS
Met with patient to discuss the benefits of cardiac rehab. Patient lives near [facility], is not interested in attending.

## 2022-02-16 NOTE — H&P (VIEW-ONLY)
Date of Office Visit:  2022  Encounter Provider: Saurabh Camp MD  Place of Service: [facility] CARDIOLOGY  Patient Name: Vandana Glass  :1960    Chief complaint: Follow-up for coronary artery disease, hypertension, diabetes, peripheral vascular disease.    History of Present Illness:    I had the pleasure of seeing the patient in cardiology office on 2022.  She is a very pleasant 61 year-old white female with a history of coronary artery disease, peripheral vascular disease, hypertension, diabetes who presents for follow-up.  The patient has formerly followed with Dr. Parra in our group.    The patient has a history of remote coronary artery bypass grafting.  I do not have the initial details or operative report, although 2 out of 4 of her bypass grafts were occluded by heart catheterization in 2018.  She also has a history of peripheral vascular disease, and underwent remote aortobifemoral bypass.  She was admitted to [facility] in 2019 with recurrent chest and arm pain, which has been her anginal equivalent in the past.  She had an abnormal Lexiscan Cardiolite stress test which showed moderate ischemia in the lateral wall and apex.  She subsequently underwent a left heart catheterization on 2018 by Dr. Solares.  This showed a 90% lesion in the mid left circumflex, occluded high and mid marginals at the ostium, and a 100% occlusion of the RCA at the ostium.  The LAD had only luminal irregularities.  The SVG to RV marginal was widely patent, although the SVG to mid marginal had a distal 70% stenosis.  The patient subsequently underwent placement of a 2.75 x 38 mm Xience JORGE to the mid left circumflex coronary artery at that time.     The patient presents today for follow-up.  For the last 6 to 8 weeks, the patient has had pressure in the center of her chest which radiates up into her neck.  She feels this very frequently, and it is worse with  exertion.  She states that walking to her mailbox and back will typically cause her to have to stop and rest.  The symptoms have been becoming more frequent in the last several days as well.  She is still having some chronic claudication symptoms in her left calf, although these are unchanged.  Unfortunately, she is still smoking.      Past Medical History:   Diagnosis Date   • Coronary artery disease    • Hyperlipidemia    • Hypertension    • Peripheral arterial disease (HCC)     Status post aortobifemoral bypass   • Type 2 diabetes mellitus (HCC)        Past Surgical History:   Procedure Laterality Date   • APPENDECTOMY     • CARDIAC CATHETERIZATION     • CARDIAC CATHETERIZATION N/A 12/28/2018    Procedure: Left ventriculography;  Surgeon: Fan Parra MD;  Location: [facility] CATH INVASIVE LOCATION;  Service: Cardiovascular   • CARDIAC CATHETERIZATION N/A 12/28/2018    Procedure: Coronary angiography;  Surgeon: Fan Parra MD;  Location[facility] CATH [facility]VAS[facility] LOC[facility];  Service: Cardiovascular   • CARDIAC CATHETERIZATION  12/28/2018    Procedure: Saphenous Vein Graft;  Surgeon: Fan Parra MD;  Location: [facility] CATH INVASIVE LOCATION;  Service: Cardiovascular   • CARDIAC CATHETERIZATION N/A 12/28/2018    Procedure: Left Heart Cath;  Surgeon: Fan Parra MD;  Location: [facility] CATH INVASIVE LOCATION;  Service: Cardiovascular   • CARDIAC CATHETERIZATION N/A 11/12/2019    Procedure: Coronary angiography;  Surgeon: Blake Solares MD;  Location: [facility] CATH INVASIVE LOCATION;  Service: Cardiovascular   • CARDIAC CATHETERIZATION N/A 11/12/2019    Procedure: Stent JORGE bypass graft;  Surgeon: Blake Solares MD;  Location: [facility] CATH INVASIVE LOCATION;  Service: Cardiovascular   • CARDIAC CATHETERIZATION N/A 11/12/2019    Procedure: Stent JORGE coronary;  Surgeon: Blake Solares MD;  Location: [facility] CATH INVASIVE LOCATION;  Service: Cardiovascular   • CARDIAC CATHETERIZATION N/A 11/12/2019    Procedure: Saphenous  Vein Graft;  Surgeon: Blake Solares MD;  Location:  [facility] CATH INVASIVE LOCATION;  Service: Cardiovascular   • CARDIAC SURGERY     • CORONARY ARTERY BYPASS GRAFT     • HYSTERECTOMY     • PA RT/LT HEART CATHETERS N/A 11/12/2019    Procedure: Percutaneous Coronary Intervention;  Surgeon: Blake Solares MD;  Location:  [facility] CATH INVASIVE LOCATION;  Service: Cardiovascular   • TUBAL ABDOMINAL LIGATION     • VASCULAR SURGERY         Current Outpatient Medications on File Prior to Visit   Medication Sig Dispense Refill   • aspirin 81 MG tablet Take 1 tablet by mouth daily.     • citalopram (CeleXA) 40 MG tablet Take 20 mg by mouth Daily.     • clopidogrel (PLAVIX) 75 MG tablet Take 1 tablet by mouth daily.     • furosemide (LASIX) 40 MG tablet Daily.     • Insulin Degludec (TRESIBA FLEXTOUCH SC) Inject 50 Units under the skin into the appropriate area as directed 2 (Two) Times a Day.     • insulin lispro (HUMALOG) 100 UNIT/ML injection Inject 20 Units under the skin into the appropriate area as directed 3 (Three) Times a Day Before Meals.     • losartan (COZAAR) 100 MG tablet Take 1 tablet by mouth Daily. 90 tablet 3   • metFORMIN (GLUCOPHAGE) 1000 MG tablet Take 1 tablet by mouth 2 (two) times a day.     • metoprolol tartrate (LOPRESSOR) 100 MG tablet Take 100 mg by mouth 2 (Two) Times a Day.     • nitroglycerin (Nitrostat) 0.4 MG SL tablet Place 1 tablet under the tongue As Needed (TAKE 1 TABLET FOR CHEST PAIN EVERY 5 MINUTES.  MAX DOSE 3 TABLETS). 25 tablet 6   • OZEMPIC, 1 MG/DOSE, 2 MG/1.5ML solution pen-injector      • pantoprazole (PROTONIX) 40 MG EC tablet Take 1 tablet by mouth daily.     • rosuvastatin (CRESTOR) 40 MG tablet      • Cholecalciferol (Vitamin D3) 165718 UNIT/GM powder Daily.     • Insulin Degludec (TRESIBA FLEXTOUCH) 200 UNIT/ML solution pen-injector pen injection      • iron polysaccharides (Ferrex 150) 150 MG capsule Daily.     • multivitamin with minerals (MULTIVITAMIN ADULT PO) Take 1  "tablet by mouth Daily.     • Semaglutide (OZEMPIC SC) Inject 0.5 mg under the skin into the appropriate area as directed 1 (One) Time Per Week.       No current facility-administered medications on file prior to visit.     Allergies as of 02/09/2022 - Reviewed 03/24/2021   Allergen Reaction Noted   • Penicillins  09/15/2016   • Venlafaxine  09/15/2016     Social History     Socioeconomic History   • Marital status:    Tobacco Use   • Smoking status: Current Every Day Smoker     Packs/day: 1.00   • Smokeless tobacco: Never Used   Substance and Sexual Activity   • Alcohol use: No   • Drug use: Not Currently   • Sexual activity: Defer     History reviewed. No pertinent family history.    Review of Systems   Constitutional: Positive for malaise/fatigue.   Cardiovascular: Positive for chest pain, dyspnea on exertion and palpitations.   Respiratory: Positive for shortness of breath.    Musculoskeletal: Positive for joint pain.   All other systems reviewed and are negative.     Objective:     Vitals:    02/09/22 1428   Height: 167.6 cm (66\")     Body mass index is 29.05 kg/m².    Physical Exam  Constitutional:       Appearance: She is well-developed.   HENT:      Head: Normocephalic and atraumatic.   Eyes:      Conjunctiva/sclera: Conjunctivae normal.   Cardiovascular:      Rate and Rhythm: Normal rate and regular rhythm.      Heart sounds: No murmur heard.  No friction rub. No gallop.    Pulmonary:      Effort: Pulmonary effort is normal.      Breath sounds: Decreased breath sounds present.   Abdominal:      Palpations: Abdomen is soft.      Tenderness: There is no abdominal tenderness.   Musculoskeletal:      Cervical back: Neck supple.   Skin:     General: Skin is warm.   Neurological:      Mental Status: She is alert and oriented to person, place, and time.   Psychiatric:         Behavior: Behavior normal.       Lab Review:     ECG 12 Lead    Date/Time: 2/9/2022 3:35 PM  Performed by: Saurabh Camp, " MD  Authorized by: Saurabh Camp MD   Comparison: compared with previous ECG from 11/13/2019  Similar to previous ECG  Rhythm: sinus rhythm  Ectopy: infrequent PVCs  Rate: normal  BPM: 69  Other findings: left ventricular hypertrophy and left atrial abnormality    Clinical impression: abnormal EKG  Comments: ST and T wave changes, consider inferolateral ischemia            Cardiac Procedures:  1.  Left heart catheterization on 11/12/2019 by Dr. Solares: The left main was normal.  The LAD was large and had luminal irregularities throughout.  The left circumflex had a 90% mid lesion.  The high and mid marginals were occluded at the ostium.  The RCA was 100% occluded at the ostium.  The SVG to RV marginal branch was widely patent.  The SVG to mid marginal branch had a distal 70% stenosis.  2 other bypass grafts were chronically occluded.  The patient underwent placement of a 2.75 x 38 mm Xience JORGE to the mid left circumflex at that time.    Assessment:       Diagnosis Plan   1. Coronary artery disease involving coronary bypass graft of native heart with unstable angina pectoris ([facility])  Case Request Cath Lab: Coronary angiography, Left heart cath, Left ventriculography   2. Primary hypertension     3. Type 2 diabetes mellitus with other circulatory complication, with long-term current use of insulin ([facility])     4. PVD (peripheral vascular disease) ([facility])       Plan:     The patient's symptoms seem to be consistent with accelerated angina.  She has been getting chest pressure going into her throat for the last 6 to 8 weeks, and the symptoms are getting more frequent.  These are worse with exertion, and improved with rest.  She has an extensive history of coronary artery disease, and is continued to smoke.  I have recommended a diagnostic left heart catheterization and bypass graft anatomy at this point.  I discussed this in detail with the patient, and she is in agreement with the plan.  She will need to hold her  Metformin 2 days prior to the heart catheterization.    I did tell her to go to the ER for any severe chest discomfort.  She will continue on aspirin, Plavix, metoprolol, losartan, and Crestor.  She does have sublingual nitroglycerin if needed.  She appears to be euvolemic on the Lasix at 40 mg/day.  She has had a very difficult time with quitting smoking, and I again emphasized the importance of this.  Further plans and follow-up will be determined after the heart catheterization.

## 2022-02-16 NOTE — PERIOPERATIVE NURSING NOTE
Dr. Kelly at bedside to speak with patient, no family present. Said okay for patient to be d/c'd at 16:30, and to follow up with ELENI ROJO in 2-4 weeks.

## 2022-06-09 ENCOUNTER — TELEPHONE (OUTPATIENT)
Dept: CARDIOLOGY | Facility: CLINIC | Age: 62
End: 2022-06-09

## 2022-06-09 NOTE — TELEPHONE ENCOUNTER
[facility] is requesting a medical release for this patient. She is needing Dental work/extraction and they are asking if patient is stable enough to be treated in a non hospital setting? Can patient alter her plavix? If so when should she stop before procedure, and when should she resume after procedure?     Return fax: [phone]

## 2022-06-09 NOTE — TELEPHONE ENCOUNTER
06/09/22  10:29 EDT    She had a stent placed in February.  Ultimately she needs at least 6 months of DAPT uninterrupted.  It would be preferable if we can get patient to 9 months of uninterrupted DAPT.  At this point it would not be safe for patient to hold Plavix.      ALIA Prater  [facility]

## 2023-02-07 NOTE — PROGRESS NOTES
RM:________     PCP: Duglas Dixon MD    : 1960  AGE: 62 y.o.  EST PATIENT   REASON FOR VISIT/  CC:    BP Readings from Last 3 Encounters:   22 167/81   21 170/88   20 140/78        WT: ____________ BP: __________L __________R HR______    CHEST PAIN: _____________    SOA: _____________PALPS: _______________     LIGHTHEADED: ___________FATIGUE: ________________ EDEMA __________    ALLERGIES:Penicillins and Venlafaxine SMOKING HISTORY:  Social History     Tobacco Use   • Smoking status: Every Day     Packs/day: 1.00     Types: Cigarettes   • Smokeless tobacco: Never   Substance Use Topics   • Alcohol use: No   • Drug use: Not Currently     CAFFEINE USE_________________  ALCOHOL ______________________    Below is the patient's most recent value for Albumin, ALT, AST, BUN, Calcium, Chloride, Cholesterol, CO2, Creatinine, GFR, Glucose, HDL, Hematocrit, Hemoglobin, Hemoglobin A1C, LDL, Magnesium, Phosphorus, Platelets, Potassium, PSA, Sodium, Triglycerides, TSH and WBC.   Lab Results   Component Value Date    BUN 15 2019    CALCIUM 8.8 2019    CL 96 (L) 2019    CHOL 112 2019    CO2 25.4 2019    CREATININE 0.91 2019    HDL 42 2019    HCT 38.5 2019    HGB 12.8 2019    LDL 41 2019     2019    K 4.2 2019     (L) 2019    TRIG 144 2019    WBC 8.47 2019          NEW DIAGNOSIS/ SURGERY/ HOSP OR ED VISITS: ______________________    __________________________________________________________________      RECENT LABS OR DIAGNOSTIC TESTING:  _____________________________    __________________________________________________________________      ASSESSMENT/ PLAN: _______________________________________________    __________________________________________________________________

## 2023-02-21 ENCOUNTER — OFFICE VISIT (OUTPATIENT)
Dept: CARDIOLOGY | Facility: CLINIC | Age: 63
End: 2023-02-21
Payer: MEDICARE

## 2023-02-21 VITALS
WEIGHT: 174.2 LBS | HEART RATE: 62 BPM | HEIGHT: 66 IN | DIASTOLIC BLOOD PRESSURE: 60 MMHG | BODY MASS INDEX: 28 KG/M2 | SYSTOLIC BLOOD PRESSURE: 124 MMHG

## 2023-02-21 DIAGNOSIS — I25.810 CORONARY ARTERY DISEASE INVOLVING CORONARY BYPASS GRAFT OF NATIVE HEART WITHOUT ANGINA PECTORIS: Primary | ICD-10-CM

## 2023-02-21 DIAGNOSIS — E11.59 TYPE 2 DIABETES MELLITUS WITH OTHER CIRCULATORY COMPLICATION, WITHOUT LONG-TERM CURRENT USE OF INSULIN: ICD-10-CM

## 2023-02-21 DIAGNOSIS — N18.30 STAGE 3 CHRONIC KIDNEY DISEASE, UNSPECIFIED WHETHER STAGE 3A OR 3B CKD: ICD-10-CM

## 2023-02-21 DIAGNOSIS — I10 PRIMARY HYPERTENSION: ICD-10-CM

## 2023-02-21 DIAGNOSIS — I73.9 PERIPHERAL ARTERIAL DISEASE: ICD-10-CM

## 2023-02-21 DIAGNOSIS — R09.89 BILATERAL CAROTID BRUITS: ICD-10-CM

## 2023-02-21 DIAGNOSIS — R22.32 MASS OF LEFT WRIST: ICD-10-CM

## 2023-02-21 DIAGNOSIS — T88.8XXA OTHER SPECIFIED COMPLICATIONS OF SURGICAL AND MEDICAL CARE, NOT ELSEWHERE CLASSIFIED, INITIAL ENCOUNTER: ICD-10-CM

## 2023-02-21 DIAGNOSIS — E78.2 MIXED HYPERLIPIDEMIA: ICD-10-CM

## 2023-02-21 PROBLEM — E78.5 HYPERLIPIDEMIA: Status: ACTIVE | Noted: 2023-02-21

## 2023-02-21 PROBLEM — I21.4 NSTEMI (NON-ST ELEVATED MYOCARDIAL INFARCTION) (HCC): Status: RESOLVED | Noted: 2018-12-28 | Resolved: 2023-02-21

## 2023-02-21 PROCEDURE — 93000 ELECTROCARDIOGRAM COMPLETE: CPT | Performed by: INTERNAL MEDICINE

## 2023-02-21 PROCEDURE — 99214 OFFICE O/P EST MOD 30 MIN: CPT | Performed by: INTERNAL MEDICINE

## 2023-02-21 RX ORDER — CITALOPRAM 20 MG/1
TABLET ORAL DAILY
COMMUNITY
Start: 2023-01-10

## 2023-02-21 NOTE — PROGRESS NOTES
Date of Office Visit: 23  Encounter Provider: James Chapman MD  Place of Service: [facility] CARDIOLOGY  Patient Name: Vandana Glass  :1960    Chief Complaint   Patient presents with   • Coronary Artery Disease   :     HPI:     Ms. Glass is 62 y.o. and presents today to establish care. I have reviewed prior notes and there are no changes except for any new updates described below. I have also reviewed any information entered into the medical record by the patient or by ancillary staff.     She has a history of CAD s/p CABG x 4V in ~. I don't have the operative report or anatomy, but 2 of her four grafts are known to be occluded. She has undergone prior stenting to the LAD (again, no details). She underwent stent placement to the mid-circumflex in  ([id number] JORGE). She presented in 2022 with unstable angina and underwent repeat coronary angiography:  *normal LM  *LAD 30% prox, patent stent  *100% RCA, patent SVG-RCA  *Cx with 99% ISR s/p 3x23 mm JORGE  *100% OM1 and OM2, patent SVG-OM    She had normal LVSF/EF 60-65% and normal LVEDP, 5mm Hg.    She denies angina.  She denies bleeding.  Shortly after the cath, she noticed a nontender, nonerythematous mass in the left wrist that has not shrunk or grown in size.  She has normal range of motion and sensation in the wrist.  She has not had any leg swelling, orthopnea, palpitations, or syncope.  She has lost a tremendous amount of weight with semaglutide.  Unfortunately, she is still smoking cigarettes.    She has PAD s/p aortofemoral (right) bypass many years ago. She denies claudication or strokelike symptoms.    Past Medical History:   Diagnosis Date   • Coronary artery disease    • Hyperlipidemia    • Hypertension    • Peripheral arterial disease (HCC)     Status post aortobifemoral bypass   • Stage 3 chronic kidney disease (HCC) 2021   • Type 2 diabetes mellitus (HCC)        Past Surgical History:    Procedure Laterality Date   • AORTA - FEMORAL ARTERY BYPASS GRAFT Right    • APPENDECTOMY     • CARDIAC CATHETERIZATION     • CARDIAC CATHETERIZATION N/A 12/28/2018    Procedure: Left ventriculography;  Surgeon: Fan Parra MD;  Location:  [facility] CATH INVASIVE LOCATION;  Service: Cardiovascular   • CARDIAC CATHETERIZATION N/A 12/28/2018    Procedure: Coronary angiography;  Surgeon: Fan Parra MD;  Location:  [facility] CATH INVASIVE LOCATION;  Service: Cardiovascular   • CARDIAC CATHETERIZATION  12/28/2018    Procedure: Saphenous Vein Graft;  Surgeon: Fan Parra MD;  Location:  [facility] CATH INVASIVE LOCATION;  Service: Cardiovascular   • CARDIAC CATHETERIZATION N/A 12/28/2018    Procedure: Left Heart Cath;  Surgeon: Fan Parra MD;  Location:  [facility] CATH INVASIVE LOCATION;  Service: Cardiovascular   • CARDIAC CATHETERIZATION N/A 11/12/2019    Procedure: Coronary angiography;  Surgeon: Blake Solares MD;  Location:  [facility] CATH INVASIVE LOCATION;  Service: Cardiovascular   • CARDIAC CATHETERIZATION N/A 11/12/2019    Procedure: Stent JORGE bypass graft;  Surgeon: Blake Solares MD;  Location:  [facility] CATH INVASIVE LOCATION;  Service: Cardiovascular   • CARDIAC CATHETERIZATION N/A 11/12/2019    Procedure: Stent JORGE coronary;  Surgeon: Blake Solares MD;  Location:  [facility] CATH INVASIVE LOCATION;  Service: Cardiovascular   • CARDIAC CATHETERIZATION N/A 11/12/2019    Procedure: Saphenous Vein Graft;  Surgeon: Blake Solares MD;  Location:  [facility] CATH INVASIVE LOCATION;  Service: Cardiovascular   • CARDIAC CATHETERIZATION Bilateral 2/16/2022    Procedure: Coronary angiography;  Surgeon: Bernarda Kelly MD;  Location:  [facility] CATH INVASIVE LOCATION;  Service: Cardiovascular;  Laterality: Bilateral;   • CARDIAC CATHETERIZATION N/A 2/16/2022    Procedure: Left heart cath;  Surgeon: Bernarda Kelly MD;  Location:  [facility] CATH INVASIVE LOCATION;  Service: Cardiovascular;  Laterality: N/A;   • CARDIAC CATHETERIZATION N/A  2/16/2022    Procedure: Left ventriculography;  Surgeon: Bernarda Kelly MD;  Location:  [facility] CATH INVASIVE LOCATION;  Service: Cardiovascular;  Laterality: N/A;   • CARDIAC CATHETERIZATION N/A 2/16/2022    Procedure: Stent JORGE coronary;  Surgeon: Bernarda Kelly MD;  Location:  [facility] CATH INVASIVE LOCATION;  Service: Cardiovascular;  Laterality: N/A;   • CARDIAC CATHETERIZATION  2/16/2022    Procedure: Saphenous Vein Graft;  Surgeon: Bernarda Kelly MD;  Location:  [facility] CATH INVASIVE LOCATION;  Service: Cardiovascular;;   • CORONARY ARTERY BYPASS GRAFT     • HYSTERECTOMY     • SD RT/LT HEART CATHETERS N/A 11/12/2019    Procedure: Percutaneous Coronary Intervention;  Surgeon: Blake Solares MD;  Location:  [facility] CATH INVASIVE LOCATION;  Service: Cardiovascular   • TUBAL ABDOMINAL LIGATION         Social History     Socioeconomic History   • Marital status:    Tobacco Use   • Smoking status: Every Day     Packs/day: 1.00     Years: 46.00     Pack years: 46.00     Types: Cigarettes   • Smokeless tobacco: Never   • Tobacco comments:     46 yrs   Vaping Use   • Vaping Use: Never used   Substance and Sexual Activity   • Alcohol use: Never   • Drug use: Never   • Sexual activity: Not Currently     Comment: Hyst.'94       History reviewed. No pertinent family history.    ROS    Allergies   Allergen Reactions   • Lisinopril Cough   • Penicillins    • Venlafaxine          Current Outpatient Medications:   •  aspirin 81 MG tablet, Take 1 tablet by mouth daily., Disp: , Rfl:   •  citalopram (CeleXA) 20 MG tablet, Daily., Disp: , Rfl:   •  clopidogrel (PLAVIX) 75 MG tablet, Take 1 tablet by mouth daily., Disp: , Rfl:   •  furosemide (LASIX) 40 MG tablet, Daily., Disp: , Rfl:   •  Insulin Degludec (TRESIBA FLEXTOUCH SC), Inject 50 Units under the skin into the appropriate area as directed 2 (Two) Times a Day., Disp: , Rfl:   •  losartan (COZAAR) 100 MG tablet, Take 1 tablet by mouth Daily., Disp: 90 tablet, Rfl: 3  •   "metFORMIN (Glucophage) 1000 MG tablet, Take 1 tablet by mouth 2 (Two) Times a Day With Meals. Indications: Type 2 Diabetes, Disp: , Rfl:   •  metoprolol tartrate (LOPRESSOR) 100 MG tablet, Take  by mouth. 200 mg in the morning and 100 mg in the evening., Disp: , Rfl:   •  nitroglycerin (Nitrostat) 0.4 MG SL tablet, Place 1 tablet under the tongue As Needed (TAKE 1 TABLET FOR CHEST PAIN EVERY 5 MINUTES.  MAX DOSE 3 TABLETS)., Disp: 25 tablet, Rfl: 6  •  OZEMPIC, 1 MG/DOSE, 2 MG/1.5ML solution pen-injector, , Disp: , Rfl:   •  pantoprazole (PROTONIX) 40 MG EC tablet, Take 1 tablet by mouth daily., Disp: , Rfl:   •  rosuvastatin (CRESTOR) 40 MG tablet, , Disp: , Rfl:   •  Semaglutide (OZEMPIC SC), Inject 1 mg under the skin into the appropriate area as directed 1 (One) Time Per Week., Disp: , Rfl:       Objective:     Vitals:    02/21/23 1113   BP: 124/60   BP Location: Left arm   Pulse: 62   Weight: 79 kg (174 lb 3.2 oz)   Height: 167.6 cm (66\")     Body mass index is 28.12 kg/m².    Vitals reviewed.   Constitutional:       Appearance: Healthy appearance. Well-developed and not in distress.   Eyes:      Conjunctiva/sclera: Conjunctivae normal.   HENT:      Head: Normocephalic.      Nose: Nose normal.   Neck:      Vascular: Carotid bruit (bilateral) present. No JVD.      Lymphadenopathy: No cervical adenopathy.   Pulmonary:      Effort: Pulmonary effort is normal.      Breath sounds: Rhonchi present.   Cardiovascular:      Normal rate. Regular rhythm.      Murmurs: There is no murmur.   Pulses:     Intact distal pulses.   Edema:     Peripheral edema absent.   Abdominal:      Palpations: Abdomen is soft.      Tenderness: There is no abdominal tenderness.   Musculoskeletal: Normal range of motion.      Cervical back: Normal range of motion.      Comments: Left wrist with 2+ radial and ulnar pulses; soft/fleshy skincolored mass, ~ 2cm, nontender, no redness, slightly mobile Skin:     General: Skin is warm and dry.      " Findings: No rash.   Neurological:      Mental Status: Alert and oriented to person, place, and time.      Cranial Nerves: No cranial nerve deficit.   Psychiatric:         Behavior: Behavior normal.         Thought Content: Thought content normal.         Judgment: Judgment normal.           ECG 12 Lead    Date/Time: 2/21/2023 11:31 AM  Performed by: James Chapman MD  Authorized by: James Chapman MD   Comparison: compared with previous ECG   Similar to previous ECG  Comparison to previous ECG: PVCs have resolved    Rhythm: sinus rhythm  Conduction: conduction normal  ST Segments: ST segments normal  T inversion: II, III, aVF, V4, V5 and V6  T flattening: V3  QRS axis: normal  Other findings: left atrial abnormality    Clinical impression: abnormal EKG              Assessment:       Diagnosis Plan   1. Coronary artery disease involving coronary bypass graft of native heart without angina pectoris  ECG 12 Lead      2. Mixed hyperlipidemia        3. Mass of left wrist  US Arterial Doppler Upper Extremity Left      4. Primary hypertension        5. Stage 3 chronic kidney disease, unspecified whether stage 3a or 3b CKD ([facility])        6. Peripheral arterial disease (HCC)  Duplex Carotid Ultrasound CAR      7. Bilateral carotid bruits  Duplex Carotid Ultrasound CAR      8. Type 2 diabetes mellitus with other circulatory complication, without long-term current use of insulin ([facility])        9. Other specified complications of surgical and medical care, not elsewhere classified, initial encounter  US Arterial Doppler Upper Extremity Left             Plan:       1/2. She is s/p remote history of 4V CABG and is known to have 2 occluded grafts.  Her last cath was in February 2022 and is described above.  She is currently not having any angina.  Given her extensive history of coronary disease requiring CABG and 3 stents (with the last being for in-stent restenosis), I would prefer to keep her on dual antiplatelet therapy as long as  she is tolerating it.  She is on rosuvastatin.    3.  It is interesting that this mass in the left wrist popped up right after the cath.  It definitely has a fleshy texture that is more consistent with something like a ganglion cyst.  It is nonpulsatile.  However, given the location right over the left radial artery, I am going to get an arterial study.    4/5.  Her blood pressure is perfectly controlled on her current regimen.  If she continues to lose weight, we may even need to de-escalate her treatment.    6/7.  She is status post aortofemoral bypass on the right and has good pulses and no angina.  She is on dual antiplatelet therapy and rosuvastatin.  She has bilateral carotid bruits and I am going to check a carotid duplex.    Sincerely,       James Chapman MD

## 2023-03-06 ENCOUNTER — HOSPITAL ENCOUNTER (OUTPATIENT)
Dept: CARDIOLOGY | Facility: HOSPITAL | Age: 63
Discharge: HOME | End: 2023-03-06
Payer: MEDICARE

## 2023-03-06 DIAGNOSIS — I73.9 PERIPHERAL ARTERIAL DISEASE: ICD-10-CM

## 2023-03-06 DIAGNOSIS — R22.32 MASS OF LEFT WRIST: ICD-10-CM

## 2023-03-06 DIAGNOSIS — T88.8XXA OTHER SPECIFIED COMPLICATIONS OF SURGICAL AND MEDICAL CARE, NOT ELSEWHERE CLASSIFIED, INITIAL ENCOUNTER: ICD-10-CM

## 2023-03-06 DIAGNOSIS — R09.89 BILATERAL CAROTID BRUITS: ICD-10-CM

## 2023-03-06 LAB
BH CV GROIN LEFT HEMATOMA LONG LENGTH: 1.19 CM
BH CV LEFT GROIN HEMATOMA LONG WIDTH: 0.44 CM
BH CV LEFT GROIN HEMATOMA TRANS WIDTH: 0.8 CM
BH CV LEFT GROIN PSA PROCEDURE SCRIPTING LRR: 1
BH CV LEFT HEMATOMA TRANS LENGTH: 0.34 CM
BH CV VAS LEFT PSA RADIAL SYS: 56.9 CM/SEC
BH CV VAS LEFT PSA ULNAR SYS: 94.4 CM/SEC
BH CV XLRA MEAS LEFT DIST CCA EDV: -19.9 CM/SEC
BH CV XLRA MEAS LEFT DIST CCA PSV: -81.4 CM/SEC
BH CV XLRA MEAS LEFT DIST ICA EDV: 24.2 CM/SEC
BH CV XLRA MEAS LEFT DIST ICA PSV: 79.4 CM/SEC
BH CV XLRA MEAS LEFT ICA/CCA RATIO: 2.73
BH CV XLRA MEAS LEFT MID ICA EDV: -23.2 CM/SEC
BH CV XLRA MEAS LEFT MID ICA PSV: -81.3 CM/SEC
BH CV XLRA MEAS LEFT PROX CCA EDV: 14.9 CM/SEC
BH CV XLRA MEAS LEFT PROX CCA PSV: 96.9 CM/SEC
BH CV XLRA MEAS LEFT PROX ECA EDV: 15.5 CM/SEC
BH CV XLRA MEAS LEFT PROX ECA PSV: 151 CM/SEC
BH CV XLRA MEAS LEFT PROX ICA EDV: 60.4 CM/SEC
BH CV XLRA MEAS LEFT PROX ICA PSV: 222 CM/SEC
BH CV XLRA MEAS LEFT PROX SCLA PSV: 200 CM/SEC
BH CV XLRA MEAS LEFT VERTEBRAL A EDV: -22.4 CM/SEC
BH CV XLRA MEAS LEFT VERTEBRAL A PSV: -61.6 CM/SEC
BH CV XLRA MEAS RIGHT DIST CCA EDV: -18 CM/SEC
BH CV XLRA MEAS RIGHT DIST CCA PSV: -77 CM/SEC
BH CV XLRA MEAS RIGHT DIST ICA EDV: 16.8 CM/SEC
BH CV XLRA MEAS RIGHT DIST ICA PSV: 73.6 CM/SEC
BH CV XLRA MEAS RIGHT ICA/CCA RATIO: 1.47
BH CV XLRA MEAS RIGHT MID ICA EDV: 28.4 CM/SEC
BH CV XLRA MEAS RIGHT MID ICA PSV: 93.8 CM/SEC
BH CV XLRA MEAS RIGHT PROX CCA EDV: 13.7 CM/SEC
BH CV XLRA MEAS RIGHT PROX CCA PSV: 80.8 CM/SEC
BH CV XLRA MEAS RIGHT PROX ECA EDV: 9.81 CM/SEC
BH CV XLRA MEAS RIGHT PROX ECA PSV: 119 CM/SEC
BH CV XLRA MEAS RIGHT PROX ICA EDV: 31.7 CM/SEC
BH CV XLRA MEAS RIGHT PROX ICA PSV: 113 CM/SEC
BH CV XLRA MEAS RIGHT PROX SCLA EDV: 10.4 CM/SEC
BH CV XLRA MEAS RIGHT PROX SCLA PSV: 158 CM/SEC
BH CV XLRA MEAS RIGHT VERTEBRAL A EDV: -6.5 CM/SEC
BH CV XLRA MEAS RIGHT VERTEBRAL A PSV: -17.6 CM/SEC
LEFT ARM BP: NORMAL MMHG
Lab: 15.2 CM/SEC
Lab: 9.38 CM/SEC
MAXIMAL PREDICTED HEART RATE: 159 BPM
MAXIMAL PREDICTED HEART RATE: 159 BPM
RIGHT ARM BP: NORMAL MMHG
STRESS TARGET HR: 135 BPM
STRESS TARGET HR: 135 BPM

## 2023-03-06 PROCEDURE — 93880 EXTRACRANIAL BILAT STUDY: CPT

## 2023-03-06 PROCEDURE — 93931 UPPER EXTREMITY STUDY: CPT

## 2023-03-06 NOTE — PROGRESS NOTES
Recommended artificial tears to use: 1 drop 2-4x a day in both eyes. Tried calling twice, no answer. Will try again tomorrow.

## 2023-03-07 NOTE — PROGRESS NOTES
I called -- moderate left side disease, continue medical therapy.    Please call her to make a follow up with JACKIE in six months.     aCli mandujano

## (undated) DEVICE — CATH DIAG IMPULSE FL4 5F 100CM

## (undated) DEVICE — KT MANIFLD CARDIAC

## (undated) DEVICE — FR5 INFINITI MULTIPAC: Brand: INFINITI

## (undated) DEVICE — PK CATH CARD 40

## (undated) DEVICE — GUIDELINER CATHETERS ARE INTENDED TO BE USED IN CONJUNCTION WITH GUIDE CATHETERS TO ACCESS DISCRETE REGIONS OF THE CORONARY AND/OR PERIPHERAL VASCULATURE, AND TO FACILITATE PLACEMENT OF INTERVENTIONAL DEVICES.: Brand: GUIDELINER® V3 CATHETER

## (undated) DEVICE — GW EMR FIX EXCHG J STD .035 3MM 260CM

## (undated) DEVICE — CATH VENT MIV RADL PIG ST TIP 5F 110CM

## (undated) DEVICE — CATH DIAG IMPULSE FR4 5F 100CM

## (undated) DEVICE — CATH GUIDE ZUMA2 EBU 6F 3.5X100CM

## (undated) DEVICE — TREK CORONARY DILATATION CATHETER 2.75 MM X 15 MM / RAPID-EXCHANGE: Brand: TREK

## (undated) DEVICE — GLIDESHEATH SLENDER STAINLESS STEEL KIT: Brand: GLIDESHEATH SLENDER

## (undated) DEVICE — 6F .070 JL3.5 100CM: Brand: CORDIS

## (undated) DEVICE — GUIDE CATHETER: Brand: MACH1™

## (undated) DEVICE — TR BAND RADIAL ARTERY COMPRESSION DEVICE: Brand: TR BAND

## (undated) DEVICE — RADIFOCUS GLIDEWIRE: Brand: GLIDEWIRE

## (undated) DEVICE — RUNTHROUGH NS EXTRA FLOPPY PTCA GUIDEWIRE: Brand: RUNTHROUGH

## (undated) DEVICE — HI-TORQUE WHISPER ES GUIDE WIRE .014 STRAIGHTTIP 3.0 CM X 190 CM: Brand: HI-TORQUE WHISPER

## (undated) DEVICE — DEV INDEFLATOR P/N 580289

## (undated) DEVICE — TREK CORONARY DILATATION CATHETER 3.0 MM X 20 MM / RAPID-EXCHANGE: Brand: TREK

## (undated) DEVICE — CATH DIAG IMPULSE IM 5F 100CM

## (undated) DEVICE — RADIFOCUS OPTITORQUE ANGIOGRAPHIC CATHETER: Brand: OPTITORQUE

## (undated) DEVICE — TREK CORONARY DILATATION CATHETER 2.50 MM X 12 MM / RAPID-EXCHANGE: Brand: TREK

## (undated) DEVICE — MINI TREK CORONARY DILATATION CATHETER 1.20 MM X 20 MM / RAPID-EXCHANGE: Brand: MINI TREK

## (undated) DEVICE — GLIDESHEATH BASIC HYDROPHILIC COATED INTRODUCER SHEATH: Brand: GLIDESHEATH

## (undated) DEVICE — Device: Brand: PROWATER

## (undated) DEVICE — 6F .070 XB 3 100CM: Brand: VISTA BRITE TIP

## (undated) DEVICE — TREK CORONARY DILATATION CATHETER 2.50 MM X 20 MM / RAPID-EXCHANGE: Brand: TREK

## (undated) DEVICE — NC TREK CORONARY DILATATION CATHETER 2.75 MM X 20 MM / RAPID-EXCHANGE: Brand: NC TREK

## (undated) DEVICE — MINI TREK CORONARY DILATATION CATHETER 2.0 MM X 20 MM / RAPID-EXCHANGE: Brand: MINI TREK

## (undated) DEVICE — CATH DIAG IMPULSE FL3.5 5F 100CM